# Patient Record
Sex: FEMALE | Race: ASIAN | NOT HISPANIC OR LATINO | URBAN - METROPOLITAN AREA
[De-identification: names, ages, dates, MRNs, and addresses within clinical notes are randomized per-mention and may not be internally consistent; named-entity substitution may affect disease eponyms.]

---

## 2020-02-10 ENCOUNTER — EMERGENCY (EMERGENCY)
Facility: HOSPITAL | Age: 60
LOS: 1 days | Discharge: HOME | End: 2020-02-10
Attending: EMERGENCY MEDICINE | Admitting: EMERGENCY MEDICINE
Payer: COMMERCIAL

## 2020-02-10 VITALS
TEMPERATURE: 98 F | DIASTOLIC BLOOD PRESSURE: 49 MMHG | RESPIRATION RATE: 18 BRPM | OXYGEN SATURATION: 98 % | HEART RATE: 75 BPM | SYSTOLIC BLOOD PRESSURE: 135 MMHG

## 2020-02-10 VITALS — RESPIRATION RATE: 18 BRPM | HEART RATE: 77 BPM | OXYGEN SATURATION: 99 %

## 2020-02-10 LAB
ALBUMIN SERPL ELPH-MCNC: 3.8 G/DL — SIGNIFICANT CHANGE UP (ref 3.3–5)
ALP SERPL-CCNC: 79 U/L — SIGNIFICANT CHANGE UP (ref 40–120)
ALT FLD-CCNC: 23 U/L — SIGNIFICANT CHANGE UP (ref 4–33)
ANION GAP SERPL CALC-SCNC: 13 MMO/L — SIGNIFICANT CHANGE UP (ref 7–14)
AST SERPL-CCNC: 21 U/L — SIGNIFICANT CHANGE UP (ref 4–32)
BASOPHILS # BLD AUTO: 0.05 K/UL — SIGNIFICANT CHANGE UP (ref 0–0.2)
BASOPHILS NFR BLD AUTO: 0.6 % — SIGNIFICANT CHANGE UP (ref 0–2)
BILIRUB SERPL-MCNC: 0.2 MG/DL — SIGNIFICANT CHANGE UP (ref 0.2–1.2)
BUN SERPL-MCNC: 11 MG/DL — SIGNIFICANT CHANGE UP (ref 7–23)
CALCIUM SERPL-MCNC: 9.5 MG/DL — SIGNIFICANT CHANGE UP (ref 8.4–10.5)
CHLORIDE SERPL-SCNC: 100 MMOL/L — SIGNIFICANT CHANGE UP (ref 98–107)
CO2 SERPL-SCNC: 23 MMOL/L — SIGNIFICANT CHANGE UP (ref 22–31)
CREAT SERPL-MCNC: 0.66 MG/DL — SIGNIFICANT CHANGE UP (ref 0.5–1.3)
EOSINOPHIL # BLD AUTO: 0.41 K/UL — SIGNIFICANT CHANGE UP (ref 0–0.5)
EOSINOPHIL NFR BLD AUTO: 5.1 % — SIGNIFICANT CHANGE UP (ref 0–6)
GLUCOSE SERPL-MCNC: 317 MG/DL — HIGH (ref 70–99)
HBA1C BLD-MCNC: 11.4 % — HIGH (ref 4–5.6)
HCT VFR BLD CALC: 38.4 % — SIGNIFICANT CHANGE UP (ref 34.5–45)
HGB BLD-MCNC: 11.4 G/DL — LOW (ref 11.5–15.5)
IMM GRANULOCYTES NFR BLD AUTO: 0.4 % — SIGNIFICANT CHANGE UP (ref 0–1.5)
LYMPHOCYTES # BLD AUTO: 3.91 K/UL — HIGH (ref 1–3.3)
LYMPHOCYTES # BLD AUTO: 48.4 % — HIGH (ref 13–44)
MCHC RBC-ENTMCNC: 23.4 PG — LOW (ref 27–34)
MCHC RBC-ENTMCNC: 29.7 % — LOW (ref 32–36)
MCV RBC AUTO: 78.7 FL — LOW (ref 80–100)
MONOCYTES # BLD AUTO: 0.78 K/UL — SIGNIFICANT CHANGE UP (ref 0–0.9)
MONOCYTES NFR BLD AUTO: 9.7 % — SIGNIFICANT CHANGE UP (ref 2–14)
NEUTROPHILS # BLD AUTO: 2.9 K/UL — SIGNIFICANT CHANGE UP (ref 1.8–7.4)
NEUTROPHILS NFR BLD AUTO: 35.8 % — LOW (ref 43–77)
NRBC # FLD: 0 K/UL — SIGNIFICANT CHANGE UP (ref 0–0)
PLATELET # BLD AUTO: 281 K/UL — SIGNIFICANT CHANGE UP (ref 150–400)
PMV BLD: 10.6 FL — SIGNIFICANT CHANGE UP (ref 7–13)
POTASSIUM SERPL-MCNC: 4.7 MMOL/L — SIGNIFICANT CHANGE UP (ref 3.5–5.3)
POTASSIUM SERPL-SCNC: 4.7 MMOL/L — SIGNIFICANT CHANGE UP (ref 3.5–5.3)
PROT SERPL-MCNC: 7.8 G/DL — SIGNIFICANT CHANGE UP (ref 6–8.3)
RBC # BLD: 4.88 M/UL — SIGNIFICANT CHANGE UP (ref 3.8–5.2)
RBC # FLD: 16.2 % — HIGH (ref 10.3–14.5)
SODIUM SERPL-SCNC: 136 MMOL/L — SIGNIFICANT CHANGE UP (ref 135–145)
WBC # BLD: 8.08 K/UL — SIGNIFICANT CHANGE UP (ref 3.8–10.5)
WBC # FLD AUTO: 8.08 K/UL — SIGNIFICANT CHANGE UP (ref 3.8–10.5)

## 2020-02-10 PROCEDURE — 99283 EMERGENCY DEPT VISIT LOW MDM: CPT

## 2020-02-10 PROCEDURE — 71046 X-RAY EXAM CHEST 2 VIEWS: CPT | Mod: 26

## 2020-02-10 RX ORDER — FLUTICASONE PROPIONATE 50 MCG
1 SPRAY, SUSPENSION NASAL
Qty: 1 | Refills: 0
Start: 2020-02-10

## 2020-02-10 NOTE — ED PROVIDER NOTE - NSFOLLOWUPINSTRUCTIONS_ED_ALL_ED_FT
Please see attached information on cough.     Please return to the ED immediately for Chest pain, Shortness of Breath, new cough, fevers/     Please follow up with your PMD in the next 1-2 weeks. Please see attached information on cough.     Please return to the ED immediately for Chest pain, Shortness of Breath, new cough, fevers    Please follow up with your PMD for diabetes management, you have a hemoglobin A1c of 11.4 and likely need to be on a higher dose of insulin.     Please follow up with your PMD in the next 1-2 weeks.

## 2020-02-10 NOTE — ED PROVIDER NOTE - PATIENT PORTAL LINK FT
You can access the FollowMyHealth Patient Portal offered by [facility] by registering at the following website: [email or web address]. By joining [employer]’s FollowMyHealth portal, you will also be able to view your health information using other applications (apps) compatible with our system.

## 2020-02-10 NOTE — ED PROVIDER NOTE - CLINICAL SUMMARY MEDICAL DECISION MAKING FREE TEXT BOX
59F hx of DM presenting with cough x 6 weeks PE: unremarkable Ddx: Cough is likely from post nasal drip, less likely pna Plan: Basic labs, CXR, likely to be discharged with fluticasone nasal spray for post nasal drip

## 2020-02-10 NOTE — ED PROVIDER NOTE - ATTENDING CONTRIBUTION TO CARE
DR. BLOCH, ATTENDING MD-  I performed a face to face bedside interview with patient regarding history of present illness, review of symptoms and past medical history. I completed an independent physical exam.  I have discussed patient's plan of care with the resident.  Patient examined well appearing NAD HEENT nml no sinus tenderness, Neck supple, heart soundsnml lungs clear, abd soft  nontender, neuro nml.

## 2020-02-10 NOTE — ED PROVIDER NOTE - NS ED ROS FT
CONST: no fevers, no chills, no trauma  EYES: no pain, no visual disturbances  ENT: no sore throat, no epistaxis, no rhinorrhea, no hearing changes  CV: no chest pain, no palpitations, no orthopnea, no extremity pain or swelling  RESP: no shortness of breath, + cough, +white sputum, no pleurisy, no wheezing  ABD: no abdominal pain, no nausea, no vomiting, no diarrhea, no black or bloody stool  : no dysuria, no hematuria, no frequency, no urgency  MSK: no back pain, no neck pain, no extremity pain  NEURO: no headache, no sensory disturbances, no focal weakness, no dizziness  HEME: no easy bleeding or bruising  SKIN: no diaphoresis, no rash

## 2020-02-10 NOTE — ED ADULT NURSE NOTE - OBJECTIVE STATEMENT
p/t is a 59y old female received awake and responsive, c/o of cough for past few weeks on and off, p/t denies any chest pain or sob, vss, bloods drawn and sent to lab, no further c/o noted will continue to monitor

## 2022-11-15 NOTE — ED ADULT TRIAGE NOTE - NS ED TRIAGE AVPU SCALE
Left message for patient to return phone call.     She scheduled for 12/09 for med refill, however, she is not due for a med check appt, she is due for a cpe appt. Please assist patient with rescheduling appt on 12/09 to cpe. Writer indicated in message left that cpe's are booking out a ways at this time. Please refill medication upon rescheduling appt for cpe.   Alert-The patient is alert, awake and responds to voice. The patient is oriented to time, place, and person. The triage nurse is able to obtain subjective information.

## 2023-09-19 NOTE — ED ADULT TRIAGE NOTE - TEMPERATURE IN CELSIUS (DEGREES C)
Spoke with pt she states since starting metoprolol succ on 9/13, she noticed tightness in chest after being on it for a few days. She also has become SOB more often with just speaking. Wheezing and wet cough. Which she says she is a smoker. She says she read wheezing and cough is a side effect listed on the bottle. She does not feel like it is a cold. But also has a slight runny nose. She wants to know if that is normal or maybe the medications needs adjusted? 36.7

## 2024-03-04 NOTE — ED PROVIDER NOTE - TOBACCO USE
Pt continues to yell " nurse, nurse I want my percocet, give me my percocet" ACP Obie Rojo was contacted via teams once again of pt's request. Never smoker

## 2024-07-29 ENCOUNTER — EMERGENCY (EMERGENCY)
Facility: HOSPITAL | Age: 64
LOS: 1 days | Discharge: HOME | End: 2024-07-29
Attending: EMERGENCY MEDICINE | Admitting: EMERGENCY MEDICINE

## 2024-07-29 VITALS
HEART RATE: 76 BPM | RESPIRATION RATE: 16 BRPM | WEIGHT: 201.94 LBS | SYSTOLIC BLOOD PRESSURE: 131 MMHG | DIASTOLIC BLOOD PRESSURE: 76 MMHG | OXYGEN SATURATION: 100 % | TEMPERATURE: 98 F

## 2024-07-29 VITALS
TEMPERATURE: 98 F | DIASTOLIC BLOOD PRESSURE: 73 MMHG | RESPIRATION RATE: 18 BRPM | HEART RATE: 67 BPM | OXYGEN SATURATION: 97 % | SYSTOLIC BLOOD PRESSURE: 110 MMHG

## 2024-07-29 PROBLEM — E11.9 TYPE 2 DIABETES MELLITUS WITHOUT COMPLICATIONS: Chronic | Status: ACTIVE | Noted: 2020-02-10

## 2024-07-29 PROCEDURE — 99283 EMERGENCY DEPT VISIT LOW MDM: CPT

## 2024-07-29 RX ORDER — ACETAMINOPHEN 325 MG
650 TABLET ORAL ONCE
Refills: 0 | Status: COMPLETED | OUTPATIENT
Start: 2024-07-29 | End: 2024-07-29

## 2024-07-29 RX ADMIN — Medication 650 MILLIGRAM(S): at 17:23
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
## 2024-07-29 NOTE — ED PROVIDER NOTE - NSFOLLOWUPINSTRUCTIONS_ED_ALL_ED_FT
A neck sprain is a stretch or tear in one of the tough, fiber-like tissues (ligaments) in your body. This is caused by an injury to the area such as a twisting mechanism. Symptoms include pain, swelling, or bruising. Rest that area over the next several days and slowly resume activity when tolerated. Ice can help with swelling and pain.     SEEK IMMEDIATE MEDICAL CARE IF YOU HAVE ANY OF THE FOLLOWING SYMPTOMS: worsening pain, inability to move that body part, numbness or tingling.

## 2024-07-29 NOTE — ED PROVIDER NOTE - PHYSICAL EXAMINATION
Gen: NAD, non-toxic appearing  Head: normal appearing  HEENT: normal conjunctiva, oral mucosa moist, PERRLA, EOMI, visual acuity- L: 20/40, R: 20/30, B/L: 20/30  Abd: soft, non distended, non tender   MSK: no visible deformities, no midline spinal tenderness  Neuro: No focal deficits, AAOx3  Skin: Warm  Psych: normal affect
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
## 2024-07-29 NOTE — ED PROVIDER NOTE - PATIENT PORTAL LINK FT
You can access the FollowMyHealth Patient Portal offered by [facility] by registering at the following website: [email or web address]. By joining [employer]’s FollowMyHealth portal, you will also be able to view your health information using other applications (apps) compatible with our system.

## 2024-12-11 ENCOUNTER — RESULT REVIEW (OUTPATIENT)
Age: 64
End: 2024-12-11

## 2024-12-11 ENCOUNTER — INPATIENT (INPATIENT)
Facility: HOSPITAL | Age: 64
LOS: 6 days | Discharge: HOME | End: 2024-12-18
Attending: INTERNAL MEDICINE | Admitting: INTERNAL MEDICINE
Payer: MEDICARE

## 2024-12-11 VITALS
SYSTOLIC BLOOD PRESSURE: 125 MMHG | HEIGHT: 65 IN | RESPIRATION RATE: 16 BRPM | TEMPERATURE: 98 F | HEART RATE: 73 BPM | OXYGEN SATURATION: 97 % | DIASTOLIC BLOOD PRESSURE: 81 MMHG | WEIGHT: 195.11 LBS

## 2024-12-11 DIAGNOSIS — Z29.9 ENCOUNTER FOR PROPHYLACTIC MEASURES, UNSPECIFIED: ICD-10-CM

## 2024-12-11 DIAGNOSIS — Z98.891 HISTORY OF UTERINE SCAR FROM PREVIOUS SURGERY: Chronic | ICD-10-CM

## 2024-12-11 DIAGNOSIS — Z86.79 PERSONAL HISTORY OF OTHER DISEASES OF THE CIRCULATORY SYSTEM: ICD-10-CM

## 2024-12-11 DIAGNOSIS — E03.9 HYPOTHYROIDISM, UNSPECIFIED: ICD-10-CM

## 2024-12-11 DIAGNOSIS — K83.8 OTHER SPECIFIED DISEASES OF BILIARY TRACT: ICD-10-CM

## 2024-12-11 DIAGNOSIS — E78.5 HYPERLIPIDEMIA, UNSPECIFIED: ICD-10-CM

## 2024-12-11 DIAGNOSIS — K21.9 GASTRO-ESOPHAGEAL REFLUX DISEASE WITHOUT ESOPHAGITIS: ICD-10-CM

## 2024-12-11 DIAGNOSIS — E11.9 TYPE 2 DIABETES MELLITUS WITHOUT COMPLICATIONS: ICD-10-CM

## 2024-12-11 DIAGNOSIS — R10.11 RIGHT UPPER QUADRANT PAIN: ICD-10-CM

## 2024-12-11 LAB
ADD ON TEST-SPECIMEN IN LAB: SIGNIFICANT CHANGE UP
ADD ON TEST-SPECIMEN IN LAB: SIGNIFICANT CHANGE UP
ALBUMIN SERPL ELPH-MCNC: 4.5 G/DL — SIGNIFICANT CHANGE UP (ref 3.3–5)
ALP SERPL-CCNC: 57 U/L — SIGNIFICANT CHANGE UP (ref 40–120)
ALT FLD-CCNC: 14 U/L — SIGNIFICANT CHANGE UP (ref 4–33)
ANION GAP SERPL CALC-SCNC: 17 MMOL/L — HIGH (ref 7–14)
APPEARANCE UR: CLEAR — SIGNIFICANT CHANGE UP
APTT BLD: 34 SEC — SIGNIFICANT CHANGE UP (ref 24.5–35.6)
AST SERPL-CCNC: 15 U/L — SIGNIFICANT CHANGE UP (ref 4–32)
B-OH-BUTYR SERPL-SCNC: 1.5 MMOL/L — HIGH (ref 0–0.4)
BASOPHILS # BLD AUTO: 0.07 K/UL — SIGNIFICANT CHANGE UP (ref 0–0.2)
BASOPHILS NFR BLD AUTO: 0.5 % — SIGNIFICANT CHANGE UP (ref 0–2)
BILIRUB SERPL-MCNC: 0.3 MG/DL — SIGNIFICANT CHANGE UP (ref 0.2–1.2)
BILIRUB UR-MCNC: NEGATIVE — SIGNIFICANT CHANGE UP
BLD GP AB SCN SERPL QL: NEGATIVE — SIGNIFICANT CHANGE UP
BUN SERPL-MCNC: 17 MG/DL — SIGNIFICANT CHANGE UP (ref 7–23)
CALCIUM SERPL-MCNC: 9.8 MG/DL — SIGNIFICANT CHANGE UP (ref 8.4–10.5)
CHLORIDE SERPL-SCNC: 100 MMOL/L — SIGNIFICANT CHANGE UP (ref 98–107)
CO2 SERPL-SCNC: 23 MMOL/L — SIGNIFICANT CHANGE UP (ref 22–31)
COLOR SPEC: YELLOW — SIGNIFICANT CHANGE UP
CREAT SERPL-MCNC: 0.73 MG/DL — SIGNIFICANT CHANGE UP (ref 0.5–1.3)
DIFF PNL FLD: NEGATIVE — SIGNIFICANT CHANGE UP
EGFR: 91 ML/MIN/1.73M2 — SIGNIFICANT CHANGE UP
EOSINOPHIL # BLD AUTO: 0.02 K/UL — SIGNIFICANT CHANGE UP (ref 0–0.5)
EOSINOPHIL NFR BLD AUTO: 0.1 % — SIGNIFICANT CHANGE UP (ref 0–6)
GLUCOSE BLDC GLUCOMTR-MCNC: 127 MG/DL — HIGH (ref 70–99)
GLUCOSE BLDC GLUCOMTR-MCNC: 144 MG/DL — HIGH (ref 70–99)
GLUCOSE BLDC GLUCOMTR-MCNC: 76 MG/DL — SIGNIFICANT CHANGE UP (ref 70–99)
GLUCOSE BLDC GLUCOMTR-MCNC: 84 MG/DL — SIGNIFICANT CHANGE UP (ref 70–99)
GLUCOSE BLDC GLUCOMTR-MCNC: 95 MG/DL — SIGNIFICANT CHANGE UP (ref 70–99)
GLUCOSE BLDC GLUCOMTR-MCNC: 99 MG/DL — SIGNIFICANT CHANGE UP (ref 70–99)
GLUCOSE SERPL-MCNC: 219 MG/DL — HIGH (ref 70–99)
GLUCOSE UR QL: 250 MG/DL
HCT VFR BLD CALC: 42.7 % — SIGNIFICANT CHANGE UP (ref 34.5–45)
HGB BLD-MCNC: 13.4 G/DL — SIGNIFICANT CHANGE UP (ref 11.5–15.5)
IANC: 11.28 K/UL — HIGH (ref 1.8–7.4)
IMM GRANULOCYTES NFR BLD AUTO: 0.4 % — SIGNIFICANT CHANGE UP (ref 0–0.9)
INR BLD: 1.04 RATIO — SIGNIFICANT CHANGE UP (ref 0.85–1.16)
KETONES UR-MCNC: 15 MG/DL
LEUKOCYTE ESTERASE UR-ACNC: NEGATIVE — SIGNIFICANT CHANGE UP
LIDOCAIN IGE QN: 41 U/L — SIGNIFICANT CHANGE UP (ref 7–60)
LYMPHOCYTES # BLD AUTO: 1.69 K/UL — SIGNIFICANT CHANGE UP (ref 1–3.3)
LYMPHOCYTES # BLD AUTO: 12.5 % — LOW (ref 13–44)
MCHC RBC-ENTMCNC: 26.5 PG — LOW (ref 27–34)
MCHC RBC-ENTMCNC: 31.4 G/DL — LOW (ref 32–36)
MCV RBC AUTO: 84.6 FL — SIGNIFICANT CHANGE UP (ref 80–100)
MONOCYTES # BLD AUTO: 0.37 K/UL — SIGNIFICANT CHANGE UP (ref 0–0.9)
MONOCYTES NFR BLD AUTO: 2.7 % — SIGNIFICANT CHANGE UP (ref 2–14)
NEUTROPHILS # BLD AUTO: 11.28 K/UL — HIGH (ref 1.8–7.4)
NEUTROPHILS NFR BLD AUTO: 83.8 % — HIGH (ref 43–77)
NITRITE UR-MCNC: NEGATIVE — SIGNIFICANT CHANGE UP
NRBC # BLD: 0 /100 WBCS — SIGNIFICANT CHANGE UP (ref 0–0)
NRBC # FLD: 0 K/UL — SIGNIFICANT CHANGE UP (ref 0–0)
NT-PROBNP SERPL-SCNC: <36 PG/ML — SIGNIFICANT CHANGE UP
PH UR: 6 — SIGNIFICANT CHANGE UP (ref 5–8)
PLATELET # BLD AUTO: 297 K/UL — SIGNIFICANT CHANGE UP (ref 150–400)
POTASSIUM SERPL-MCNC: 4.2 MMOL/L — SIGNIFICANT CHANGE UP (ref 3.5–5.3)
POTASSIUM SERPL-SCNC: 4.2 MMOL/L — SIGNIFICANT CHANGE UP (ref 3.5–5.3)
PROCALCITONIN SERPL-MCNC: 0.06 NG/ML — SIGNIFICANT CHANGE UP (ref 0.02–0.1)
PROT SERPL-MCNC: 8.2 G/DL — SIGNIFICANT CHANGE UP (ref 6–8.3)
PROT UR-MCNC: NEGATIVE MG/DL — SIGNIFICANT CHANGE UP
PROTHROM AB SERPL-ACNC: 12.1 SEC — SIGNIFICANT CHANGE UP (ref 9.9–13.4)
RBC # BLD: 5.05 M/UL — SIGNIFICANT CHANGE UP (ref 3.8–5.2)
RBC # FLD: 14.4 % — SIGNIFICANT CHANGE UP (ref 10.3–14.5)
RH IG SCN BLD-IMP: POSITIVE — SIGNIFICANT CHANGE UP
SODIUM SERPL-SCNC: 140 MMOL/L — SIGNIFICANT CHANGE UP (ref 135–145)
SP GR SPEC: 1.04 — HIGH (ref 1–1.03)
TROPONIN T, HIGH SENSITIVITY RESULT: <6 NG/L — SIGNIFICANT CHANGE UP
UROBILINOGEN FLD QL: 0.2 MG/DL — SIGNIFICANT CHANGE UP (ref 0.2–1)
WBC # BLD: 13.49 K/UL — HIGH (ref 3.8–10.5)
WBC # FLD AUTO: 13.49 K/UL — HIGH (ref 3.8–10.5)

## 2024-12-11 PROCEDURE — 99223 1ST HOSP IP/OBS HIGH 75: CPT | Mod: GC

## 2024-12-11 PROCEDURE — 93306 TTE W/DOPPLER COMPLETE: CPT | Mod: 26

## 2024-12-11 PROCEDURE — 99285 EMERGENCY DEPT VISIT HI MDM: CPT

## 2024-12-11 PROCEDURE — 99222 1ST HOSP IP/OBS MODERATE 55: CPT | Mod: GC

## 2024-12-11 PROCEDURE — 76705 ECHO EXAM OF ABDOMEN: CPT | Mod: 26

## 2024-12-11 PROCEDURE — 76376 3D RENDER W/INTRP POSTPROCES: CPT | Mod: 26

## 2024-12-11 PROCEDURE — 93356 MYOCRD STRAIN IMG SPCKL TRCK: CPT

## 2024-12-11 RX ORDER — ONDANSETRON HYDROCHLORIDE 4 MG/1
4 TABLET, FILM COATED ORAL ONCE
Refills: 0 | Status: COMPLETED | OUTPATIENT
Start: 2024-12-11 | End: 2024-12-11

## 2024-12-11 RX ORDER — METOPROLOL TARTRATE 100 MG/1
50 TABLET, FILM COATED ORAL DAILY
Refills: 0 | Status: DISCONTINUED | OUTPATIENT
Start: 2024-12-11 | End: 2024-12-18

## 2024-12-11 RX ORDER — LEVOTHYROXINE SODIUM 150 MCG
112 TABLET ORAL DAILY
Refills: 0 | Status: DISCONTINUED | OUTPATIENT
Start: 2024-12-11 | End: 2024-12-18

## 2024-12-11 RX ORDER — INSULIN GLARGINE 100 [IU]/ML
4 INJECTION, SOLUTION SUBCUTANEOUS AT BEDTIME
Refills: 0 | Status: DISCONTINUED | OUTPATIENT
Start: 2024-12-11 | End: 2024-12-11

## 2024-12-11 RX ORDER — INSULIN GLARGINE 100 [IU]/ML
6 INJECTION, SOLUTION SUBCUTANEOUS AT BEDTIME
Refills: 0 | Status: DISCONTINUED | OUTPATIENT
Start: 2024-12-11 | End: 2024-12-18

## 2024-12-11 RX ORDER — RAMIPRIL 10 MG/1
1 CAPSULE ORAL
Refills: 0 | DISCHARGE

## 2024-12-11 RX ORDER — 0.9 % SODIUM CHLORIDE 0.9 %
1000 INTRAVENOUS SOLUTION INTRAVENOUS
Refills: 0 | Status: DISCONTINUED | OUTPATIENT
Start: 2024-12-11 | End: 2024-12-18

## 2024-12-11 RX ORDER — METOCLOPRAMIDE HYDROCHLORIDE 10 MG/1
5 TABLET ORAL EVERY 8 HOURS
Refills: 0 | Status: DISCONTINUED | OUTPATIENT
Start: 2024-12-11 | End: 2024-12-18

## 2024-12-11 RX ORDER — ENOXAPARIN SODIUM 30 MG/.3ML
40 INJECTION SUBCUTANEOUS EVERY 24 HOURS
Refills: 0 | Status: DISCONTINUED | OUTPATIENT
Start: 2024-12-11 | End: 2024-12-15

## 2024-12-11 RX ORDER — KETOROLAC TROMETHAMINE 30 MG/ML
15 INJECTION INTRAMUSCULAR; INTRAVENOUS ONCE
Refills: 0 | Status: DISCONTINUED | OUTPATIENT
Start: 2024-12-11 | End: 2024-12-11

## 2024-12-11 RX ORDER — 0.9 % SODIUM CHLORIDE 0.9 %
1000 INTRAVENOUS SOLUTION INTRAVENOUS
Refills: 0 | Status: DISCONTINUED | OUTPATIENT
Start: 2024-12-11 | End: 2024-12-12

## 2024-12-11 RX ORDER — GLUCAGON INJECTION, SOLUTION 0.5 MG/.1ML
1 INJECTION, SOLUTION SUBCUTANEOUS ONCE
Refills: 0 | Status: DISCONTINUED | OUTPATIENT
Start: 2024-12-11 | End: 2024-12-18

## 2024-12-11 RX ORDER — ACETAMINOPHEN 500MG 500 MG/1
1000 TABLET, COATED ORAL ONCE
Refills: 0 | Status: COMPLETED | OUTPATIENT
Start: 2024-12-11 | End: 2024-12-11

## 2024-12-11 RX ORDER — 0.9 % SODIUM CHLORIDE 0.9 %
1000 INTRAVENOUS SOLUTION INTRAVENOUS
Refills: 0 | Status: DISCONTINUED | OUTPATIENT
Start: 2024-12-11 | End: 2024-12-11

## 2024-12-11 RX ORDER — LEVOTHYROXINE SODIUM 150 MCG
1 TABLET ORAL
Refills: 0 | DISCHARGE

## 2024-12-11 RX ORDER — PANTOPRAZOLE SODIUM 40 MG/1
1 TABLET, DELAYED RELEASE ORAL
Refills: 0 | DISCHARGE

## 2024-12-11 RX ORDER — PANTOPRAZOLE SODIUM 40 MG/1
40 TABLET, DELAYED RELEASE ORAL
Refills: 0 | Status: DISCONTINUED | OUTPATIENT
Start: 2024-12-11 | End: 2024-12-18

## 2024-12-11 RX ORDER — EMPAGLIFLOZIN 25 MG/1
1 TABLET, FILM COATED ORAL
Refills: 0 | DISCHARGE

## 2024-12-11 RX ORDER — SODIUM CHLORIDE 9 MG/ML
1000 INJECTION, SOLUTION INTRAMUSCULAR; INTRAVENOUS; SUBCUTANEOUS ONCE
Refills: 0 | Status: COMPLETED | OUTPATIENT
Start: 2024-12-11 | End: 2024-12-11

## 2024-12-11 RX ORDER — ROSUVASTATIN CALCIUM 5 MG/1
1 TABLET, FILM COATED ORAL
Refills: 0 | DISCHARGE

## 2024-12-11 RX ORDER — GLIMEPIRIDE 1 MG/1
1 TABLET ORAL
Refills: 0 | DISCHARGE

## 2024-12-11 RX ORDER — LISINOPRIL 20 MG/1
20 TABLET ORAL DAILY
Refills: 0 | Status: DISCONTINUED | OUTPATIENT
Start: 2024-12-11 | End: 2024-12-17

## 2024-12-11 RX ORDER — ONDANSETRON HYDROCHLORIDE 4 MG/1
4 TABLET, FILM COATED ORAL ONCE
Refills: 0 | Status: DISCONTINUED | OUTPATIENT
Start: 2024-12-11 | End: 2024-12-11

## 2024-12-11 RX ORDER — ROSUVASTATIN CALCIUM 5 MG/1
5 TABLET, FILM COATED ORAL AT BEDTIME
Refills: 0 | Status: DISCONTINUED | OUTPATIENT
Start: 2024-12-11 | End: 2024-12-18

## 2024-12-11 RX ADMIN — ENOXAPARIN SODIUM 40 MILLIGRAM(S): 30 INJECTION SUBCUTANEOUS at 17:10

## 2024-12-11 RX ADMIN — ACETAMINOPHEN 500MG 1000 MILLIGRAM(S): 500 TABLET, COATED ORAL at 13:03

## 2024-12-11 RX ADMIN — Medication 81 MILLIGRAM(S): at 17:08

## 2024-12-11 RX ADMIN — ONDANSETRON HYDROCHLORIDE 4 MILLIGRAM(S): 4 TABLET, FILM COATED ORAL at 04:15

## 2024-12-11 RX ADMIN — KETOROLAC TROMETHAMINE 15 MILLIGRAM(S): 30 INJECTION INTRAMUSCULAR; INTRAVENOUS at 04:16

## 2024-12-11 RX ADMIN — METOPROLOL TARTRATE 50 MILLIGRAM(S): 100 TABLET, FILM COATED ORAL at 17:08

## 2024-12-11 RX ADMIN — INSULIN GLARGINE 6 UNIT(S): 100 INJECTION, SOLUTION SUBCUTANEOUS at 23:44

## 2024-12-11 RX ADMIN — Medication 100 MILLILITER(S): at 09:35

## 2024-12-11 RX ADMIN — ROSUVASTATIN CALCIUM 5 MILLIGRAM(S): 5 TABLET, FILM COATED ORAL at 22:22

## 2024-12-11 RX ADMIN — ACETAMINOPHEN 500MG 400 MILLIGRAM(S): 500 TABLET, COATED ORAL at 12:03

## 2024-12-11 RX ADMIN — SODIUM CHLORIDE 1000 MILLILITER(S): 9 INJECTION, SOLUTION INTRAMUSCULAR; INTRAVENOUS; SUBCUTANEOUS at 04:16

## 2024-12-11 RX ADMIN — ONDANSETRON HYDROCHLORIDE 4 MILLIGRAM(S): 4 TABLET, FILM COATED ORAL at 06:16

## 2024-12-11 NOTE — H&P ADULT - HISTORY OF PRESENT ILLNESS
Vero Soto is a 65 yo F with a PMHx of DM2 on insulin, HTN, HLD, hypothyroidism presenting with 12 hours of RUQ pain associated with nausea and vomiting. Last night patient ate some spicy duck she prepared at home. About two hours later, she began having sharp RUQ pain, associated with nausea and multiple episodes of vomiting. Emesis is bilious, with undigested food and without blood. She also has a headache. Patient has vomited with any PO intake. She reports that this has never happened before. No one else ate the food. No recent travel, no sick contacts, no recent infections. Patient's only surgical history is  section. Patient reports some chills but no CP, SOB, diarrhea, melena or dysuria.     In the ED, patient's vital signs was afebrile at 98.2, HR of 73, /81, RR 16, SpO2 97% on RA. Labs were notable for leukocytosis of 13.49 and hyperglycemia at 219, with a UA notable for + glucose and ketones. RUQ ultrasound in the ED revealed cholelithiasis w/o evidence of acute cholecystitis with a dilated common bile duct.  Vero Soto is a 63 yo F with a PMHx of DM2 on insulin, HTN, HLD, hypothyroidism presenting with 12 hours of RUQ pain associated with nausea and vomiting. Last night patient ate some spicy duck she prepared at home. About two hours later, she began having sharp RUQ pain, associated with nausea and multiple episodes of vomiting. Emesis is bilious, with undigested food and without blood. She also has a headache. Patient has vomited with any PO intake. She reports that this has never happened before. No one else ate the food. No recent travel, no sick contacts, no recent infections. Patient's only surgical history is  section. Patient reports some chills but no CP, SOB, diarrhea, melena or dysuria.     In the ED, patient's vital signs was afebrile at 98.2, HR of 73, /81, RR 16, SpO2 97% on RA. Labs were notable for leukocytosis of 13.49 and hyperglycemia at 219, with a UA notable for + glucose and ketones. RUQ ultrasound in the ED revealed cholelithiasis w/o evidence of acute cholecystitis with a dilated common bile duct. S/p 2 L of NS, zofran and IV tylenol and admitted to medicine for workup of RUQ pain.

## 2024-12-11 NOTE — ED PROVIDER NOTE - ATTENDING APP SHARED VISIT CONTRIBUTION OF CARE
64-year-old female with a history of non-insulin-dependent diabetes, GERD, hypertension, hyperlipidemia, anemia that has not had any surgical procedures in her life that presents with abdominal pain, nausea, vomiting starting approximately 8 hours prior to arrival.  Patient states that she last ate 12 hours prior to arrival had some chicken approximately 2 hours later started having epigastric and right upper quadrant pain 10/10 sharp achy nonradiating associate with multiple episodes of nonbloody nonbilious emesis.  She has not been able to take any medications but came to the ED for evaluation.  Currently denies any chest pain, shortness of breath and has been in her usual state of health without any urinary symptoms diarrhea, fevers, chills, travel.  No sick contacts otherwise no flank pain.  No smoking no drinking no drugs    Patient is well-appearing no acute distress rubbing the right side of her abdomen.  Abdomen is soft with a positive Hughes's negative McBurney's no rebound or guarding or masses no CVA tenderness bilaterally. Normal and equal pulses x 4.     64-year-old female with a history of non-insulin-dependent diabetes, GERD, hypertension, hyperlipidemia, anemia that has not had any surgical procedures in her life that presents with abdominal pain, nausea, vomiting starting approximately 8 hours prior to arrival.  Patient found to have positive Hughes's on exam and due to her nausea vomiting and abdominal pain in the right upper quadrant concern is likely for gallstones/biliary colic versus cholecystitis although no fevers or other signs and symptoms of infection at this time.  Also considered to kidney stone although no flank pain also consider dissection although highly unlikely as patient has bilateral and equal pulses.  No back pain or other concerns or symptoms such as hypotension or tachycardia.  No chest pain.  Will obtain labs and imaging provide medications and fluids and reassess.

## 2024-12-11 NOTE — ED ADULT NURSE REASSESSMENT NOTE - NS ED NURSE REASSESS COMMENT FT1
Pt sitting in stretcher. Endorsing nausea at this time. Emesis bag at bedside. MD Zavaleta paged for further instruction. VSS. Respirations even and unlabored. Admitted for Dilated Bile Duct, pending transfer to ESSU 2.

## 2024-12-11 NOTE — PATIENT PROFILE ADULT - FALL HARM RISK - HARM RISK INTERVENTIONS

## 2024-12-11 NOTE — ED ADULT TRIAGE NOTE - CHIEF COMPLAINT QUOTE
Pt complains of RUQ pain that started around 2000 yesterday associated nausea and vomiting. History of HTN, DM2, HLD.

## 2024-12-11 NOTE — H&P ADULT - PROBLEM SELECTOR PLAN 1
RUQ pain associated with N/V. + leukocytosis of 13.49. US in ED revealed cholelithaisis w/o evidence of acute cholecystitis and dilated common bile duct    Plan  - NPO  - f/u GI recs  - GI PCR  - obtain troponin RUQ pain associated with N/V. + leukocytosis of 13.49. US in ED revealed cholelithaisis w/o evidence of acute cholecystitis and dilated common bile duct    Plan  - NPO  - f/u GI recs  - Obtain MRCP   - GI PCR if has diarrhea   - obtain troponin to r/o atypical CP

## 2024-12-11 NOTE — CONSULT NOTE ADULT - ASSESSMENT
64F with NIDDM, GERD, hypertension, hyperlipidemia, presents with RUQ/epigastric abdominal pain associated with nausea/vomiting    Impression  #RUQ/epigastric pain  #nausea/vomiting  Patient presenting with RUQ/epigastric pain associated with nausea/vomiting following dinner (duck) yesterday evening. Hemodynamically stable. Labs with WBC 13k; LFTs normal. RUQUS with Cholelithiasis. Mild distention. Negative sonographic CBD 8mm. Suspect presentation related to gastroenteritis vs. less likely choledocholithiasis given normal liver tests; CBD of 8mm likely normal for her age.    Recommendations  - follow up MRCP  - trend CBC, CMP  - pain control, anti-emetics PRN    Note and recommendations are incomplete until reviewed and attested by attending.    Lili Krueger MD  GI/Hepatology Fellow, PGY5  [place] Pager [phone] or [facility] Pager [phone]  Teams preferred (7AM to 5PM); after 5PM, call GI fellow on call    On Weekends/Holidays (All Day) and Weekdays after 5PM to 8AM  For non-urgent consults, please email [email or web address] and [email or web address]  For urgent consults, please contact on call GI team. See Amion schedule ([facility]), [employer] paging system ([facility]), or call hospital  ([facility]) 64F with NIDDM, GERD, hypertension, hyperlipidemia, presents with RUQ/epigastric abdominal pain associated with nausea/vomiting    Impression  #RUQ/epigastric pain  #nausea/vomiting  Patient presenting with RUQ/epigastric pain associated with nausea/vomiting following dinner (duck) yesterday evening. Hemodynamically stable. Labs with WBC 13k; LFTs normal. RUQUS with Cholelithiasis. Mild distention. Negative sonographic pal.  CBD 8mm.     Suspect presentation related to gastroenteritis vs. less likely choledocholithiasis given normal liver tests.    Recommendations  - follow up MRCP  - trend CBC, CMP  - pain control, anti-emetics PRN    Note and recommendations are incomplete until reviewed and attested by attending.    Lili Krueger MD  GI/Hepatology Fellow, PGY5  [place] Pager [phone] or [employer] Pager [phone]  Teams preferred (7AM to 5PM); after 5PM, call GI fellow on call    On Weekends/Holidays (All Day) and Weekdays after 5PM to 8AM  For non-urgent consults, please email [email or web address] and [email or web address]  For urgent consults, please contact on call GI team. See Amion schedule ([facility]), "[employer] paging system ([facility]), or call hospital  ([facility])

## 2024-12-11 NOTE — H&P ADULT - NSHPPHYSICALEXAM_GEN_ALL_CORE
GENERAL: NAD, lying in bed comfortably  HEAD: Atraumatic, normocephalic  EYES: EOMI, PERRLA, conjunctiva and sclera clear  ENT: Moist mucous membranes  HEART: S1, S2, Regular rate and rhythm, no murmurs, rubs, or gallops  LUNGS: Unlabored respirations, clear to auscultation bilaterally, no crackles, wheezing, or rhonchi  ABDOMEN: + tenderness of RUQ. Soft,  nondistended, +BS, No hepatosplenomegaly, no CVA tenderness   EXTREMITIES: Trace pitting edema b/l lower extremities. 2+ peripheral pulses bilaterally. No clubbing or cyanosis.  NERVOUS SYSTEM:  A&Ox3, no focal deficits   SKIN: Abdominal striae. No other rashes or lesions. GENERAL: NAD, lying in bed comfortably  HEAD: Atraumatic, normocephalic  EYES: EOMI, PERRLA, conjunctiva and sclera clear  ENT: Moist mucous membranes  HEART: S1, S2, Regular rate and rhythm, no murmurs, rubs, or gallops  LUNGS: Unlabored respirations, clear to auscultation bilaterally, no crackles, wheezing, or rhonchi  ABDOMEN: + tenderness of RUQ. Soft,  nondistended, +BS, No hepatosplenomegaly, no CVA tenderness. Negative Hughes's sign   EXTREMITIES: Trace pitting edema b/l lower extremities. 2+ peripheral pulses bilaterally. No clubbing or cyanosis.  NERVOUS SYSTEM:  A&Ox3, no focal deficits   SKIN: Abdominal striae. No other rashes or lesions.

## 2024-12-11 NOTE — ED ADULT NURSE NOTE - OBJECTIVE STATEMENT
Pt is A&O x 4, ambulatory w/o assistance, presents to the ED w/ worsening RUQ pain since 2000, 12/10/24. Pt also endorsing nausea w/ episodes of vomitus and subjective fevers/chills. Denies gu sx, dizziness/vision changes,  h/a, SOB or chest discomfort. VSS. Respirations even and unlabored. Pt is A&O x 4, ambulatory w/o assistance, presents to the ED w/ worsening RUQ pain since 2000, 12/10/24. Pt also endorsing nausea w/ "many" episodes of vomitus and subjective fevers/chills. Denies gu sx, dizziness/vision changes,  h/a, SOB or chest discomfort. VSS. Respirations even and unlabored. Left AC 20 gauge IV line placed and labs drawn. Medications administered and IVF initiated as per MAR. Pending lab results and US.

## 2024-12-11 NOTE — H&P ADULT - ASSESSMENT
Vero Soto is a 63 yo F with a PMHx of DM2 on insulin, HTN, HLD, hypothyroidism presenting with acute RUQ pain associated with nausea and vomiting. Physical exam is notable for RUQ tenderness to palpation, and labs are notable for leukocytosis of 13.5, and + glucose and ketones in urine. DDx includes gastritis/gastroenteritis vs. cholecystitis vs. MI  Vero Soto is a 65 yo F with a PMHx of DM2 on insulin, HTN, HLD, hypothyroidism presenting with acute RUQ pain associated with nausea and vomiting. Physical exam is notable for RUQ tenderness to palpation, and labs are notable for leukocytosis of 13.5, and + glucose and ketones in urine. DDx includes gastritis/gastroenteritis vs. choledocholithiasis vs. MI

## 2024-12-11 NOTE — H&P ADULT - NSHPREVIEWOFSYSTEMS_GEN_ALL_CORE
REVIEW OF SYSTEMS:    CONSTITUTIONAL: No weakness, fevers or chills  EYES/ENT: No visual changes;  No vertigo or throat pain   NECK: No pain or stiffness  RESPIRATORY: No cough, wheezing, hemoptysis; No shortness of breath  CARDIOVASCULAR: No chest pain or palpitations  GASTROINTESTINAL: As above  GENITOURINARY: No dysuria, frequency or hematuria  NEUROLOGICAL: No numbness or weakness  SKIN: No itching, rashes

## 2024-12-11 NOTE — ED PROVIDER NOTE - OBJECTIVE STATEMENT
Patient is a 64-year-old female, past medical history of high blood pressure and diabetes presenting with a complaint of right upper quadrant pain associated with nausea and vomiting.  Patient reports eating a few hours prior to the onset of symptoms.  No associated fever, chills, urinary symptoms, chest pain.

## 2024-12-11 NOTE — ED PROVIDER NOTE - CLINICAL SUMMARY MEDICAL DECISION MAKING FREE TEXT BOX
Patient is a 64-year-old female, past medical history of high blood pressure and diabetes presenting with a complaint of right upper quadrant pain associated with nausea and vomiting. On presentation patient appears comfortable, tenderness noted right upper quadrant.  Symptoms concerning for possible biliary pathology, pancreatitis.  Plan for routine labs including lipase, right upper quadrant sono will provide analgesics and antiemetics.

## 2024-12-11 NOTE — H&P ADULT - PROBLEM SELECTOR PLAN 7
DVT prophylaxis: SC Heparin  Code Status: Full Code  Diet: NPO  Dispo: Pending clinical improvement DVT prophylaxis: SC Heparin  Code Status: Full Code  Diet: NPO for now   Dispo: Pending clinical improvement

## 2024-12-11 NOTE — H&P ADULT - NSICDXPASTMEDICALHX_GEN_ALL_CORE_FT
PAST MEDICAL HISTORY:  Diabetes     H/O: HTN (hypertension)     HLD (hyperlipidemia)     Hypothyroid

## 2024-12-11 NOTE — ED ADULT NURSE NOTE - NSFALLUNIVINTERV_ED_ALL_ED
Bed/Stretcher in lowest position, wheels locked, appropriate side rails in place/Call bell, personal items and telephone in reach/Instruct patient to call for assistance before getting out of bed/chair/stretcher/Non-slip footwear applied when patient is off stretcher/[place] to call system/Physically safe environment - no spills, clutter or unnecessary equipment/Purposeful proactive rounding/Room/bathroom lighting operational, light cord in reach

## 2024-12-11 NOTE — H&P ADULT - PROBLEM SELECTOR PLAN 2
Hx of DM2 on insulin, metformin 1000 mg, Jardiance 10 mg, Glimepriride 4 mg at home.     Plan  - hold oral medications Hx of DM2 on insulin (12 U qhs), metformin 1000 mg, Jardiance 10 mg, Glimepriride 4 mg at home.     Plan  - hold oral medications  - begin basal 6 U QHS  - SSI TID QHS  - FS TID QHS Hx of DM2 on insulin (12 U lantus qhs), metformin 1000 mg, Jardiance 10 mg, Glimepiride 4 mg at home.     Plan  - hold oral medications  - begin basal 6 U QHS  - SSI q6h while NPO  - FS q6h while NPO Hx of DM2 on insulin (12 U lantus qhs), metformin 1000 mg, Jardiance 10 mg, Glimepiride 4 mg at home.     Plan  - hold oral medications  - begin basal 6 U QHS (NPO)  - SSI q6h while NPO  - FS q6h while NPO

## 2024-12-11 NOTE — H&P ADULT - NSHPLABSRESULTS_GEN_ALL_CORE
EKG: personally reviewed-  NSR with chronic T wave inversion    LABS: personally reviewed                        13.4   13.49 )-----------( 297      ( 11 Dec 2024 04:15 )             42.7         140  |  100  |  17  ----------------------------<  219[H]  4.2   |  23  |  0.73    Ca    9.8      11 Dec 2024 04:15    TPro  8.2  /  Alb  4.5  /  TBili  0.3  /  DBili  x   /  AST  15  /  ALT  14  /  AlkPhos  57  12-11    PT/INR - ( 11 Dec 2024 04:15 )   PT: 12.1 sec;   INR: 1.04 ratio         PTT - ( 11 Dec 2024 04:15 )  PTT:34.0 sec  Urinalysis Basic - ( 11 Dec 2024 07:37 )    Color: Yellow / Appearance: Clear / S.044 / pH: x  Gluc: x / Ketone: 15 mg/dL  / Bili: Negative / Urobili: 0.2 mg/dL   Blood: x / Protein: Negative mg/dL / Nitrite: Negative   Leuk Esterase: Negative / RBC: x / WBC x   Sq Epi: x / Non Sq Epi: x / Bacteria: x          IMAGING: personally reviewed    RUQ US  Cholelithiasis without sonographic evidence of acute cholecystitis.  Dilated common bile duct. Recommend further evaluation with contrast-enhanced MR abdomen/MRCP.

## 2024-12-11 NOTE — H&P ADULT - ATTENDING COMMENTS
Would obtain MRCP to rule out choledocholethiasis.  NPO for now.  plan of care discussed with daughter at bedside.

## 2024-12-11 NOTE — CONSULT NOTE ADULT - SUBJECTIVE AND OBJECTIVE BOX
Chief Complaint:  Patient is a 64y old  Female who presents with a chief complaint of Abdominal pain (11 Dec 2024 07:20)    HPI:  64F with NIDDM, GERD, hypertension, hyperlipidemia, presents with RUQ/epigastric abdominal pain described as an ache and non-radiating; associated with nausea and multiple episodes of vomiting since yesterday; reports eating duck about 2 hours prior to symptom onset. No diarrhea, fevers.  No similar symptoms in the past. No sick contacts. Prior surgical hx:     Allergies:  Allergy Status Unknown      Home Medications:    Hospital Medications:  dextrose 5%. 1000 milliLiter(s) IV Continuous <Continuous>  dextrose 5%. 1000 milliLiter(s) IV Continuous <Continuous>  dextrose 50% Injectable 25 Gram(s) IV Push once  dextrose 50% Injectable 12.5 Gram(s) IV Push once  dextrose 50% Injectable 25 Gram(s) IV Push once  dextrose Oral Gel 15 Gram(s) Oral once PRN  glucagon  Injectable 1 milliGRAM(s) IntraMuscular once  insulin glargine Injectable (LANTUS) 4 Unit(s) SubCutaneous at bedtime  insulin lispro (ADMELOG) corrective regimen sliding scale   SubCutaneous three times a day before meals  insulin lispro (ADMELOG) corrective regimen sliding scale   SubCutaneous at bedtime  lactated ringers. 1000 milliLiter(s) IV Continuous <Continuous>      PMHX/PSHX:  Diabetes    H/O: HTN (hypertension)    Hypothyroid    HLD (hyperlipidemia)    H/O  section      ROS:   General:  No wt loss, fevers, chills  ENT:  No dysphagia  CV:  No pain, palpitations  Pulm:  No dyspnea, cough  GI:  +pain, +nausea, +vomiting, No diarrhea, No constipation, No rectal bleeding, No tarry stools  Muscle:  No pain, weakness  Neuro:  No weakness  Heme:  No ecchymosis  Skin:  No rash    PHYSICAL EXAM:  GENERAL:  No acute distress  HEENT:  no scleral icterus  CHEST:  no accessory muscle use  HEART:  Regular rate and rhythm  ABDOMEN:  Soft, RUQ tenderness, non-distended  EXTREMITIES: No edema  SKIN:  No rash/ecchymoses  NEURO:  Alert and oriented x 3    Vital Signs:  Vital Signs Last 24 Hrs  T(C): 36.7 (11 Dec 2024 07:45), Max: 36.8 (11 Dec 2024 03:03)  T(F): 98.1 (11 Dec 2024 07:45), Max: 98.2 (11 Dec 2024 03:03)  HR: 70 (11 Dec 2024 07:45) (70 - 73)  BP: 134/66 (11 Dec 2024 07:45) (125/81 - 134/66)  BP(mean): --  RR: 15 (11 Dec 2024 07:45) (15 - 16)  SpO2: 100% (11 Dec 2024 07:45) (97% - 100%)    Parameters below as of 11 Dec 2024 07:45  Patient On (Oxygen Delivery Method): room air      Daily Height in cm: 165.1 (11 Dec 2024 03:03)    Daily     LABS:                        13.4   13.49 )-----------( 297      ( 11 Dec 2024 04:15 )             42.7     Mean Cell Volume: 84.6 fL (24 @ 04:15)        140  |  100  |  17  ----------------------------<  219[H]  4.2   |  23  |  0.73    Ca    9.8      11 Dec 2024 04:15    TPro  8.2  /  Alb  4.5  /  TBili  0.3  /  DBili  x   /  AST  15  /  ALT  14  /  AlkPhos  57      LIVER FUNCTIONS - ( 11 Dec 2024 04:15 )  Alb: 4.5 g/dL / Pro: 8.2 g/dL / ALK PHOS: 57 U/L / ALT: 14 U/L / AST: 15 U/L / GGT: x           PT/INR - ( 11 Dec 2024 04:15 )   PT: 12.1 sec;   INR: 1.04 ratio         PTT - ( 11 Dec 2024 04:15 )  PTT:34.0 sec  Urinalysis Basic - ( 11 Dec 2024 07:37 )    Color: Yellow / Appearance: Clear / S.044 / pH: x  Gluc: x / Ketone: 15 mg/dL  / Bili: Negative / Urobili: 0.2 mg/dL   Blood: x / Protein: Negative mg/dL / Nitrite: Negative   Leuk Esterase: Negative / RBC: x / WBC x   Sq Epi: x / Non Sq Epi: x / Bacteria: x      Amylase Serum--      Lipase serum41       Ammonia--                          13.4   13.49 )-----------( 297      ( 11 Dec 2024 04:15 )             42.7       Imaging:      < from: US Abdomen Upper Quadrant Right (12.11.24 @ 05:11) >  FINDINGS:  Liver: Within normal limits.  Bile ducts: Dilated. Common bile duct measures 8 mm.  Gallbladder: Cholelithiasis. Mild distention. Negative sonographic   Hughes's sign. No mural thickening or pericholecystic fluid.  Pancreas: Poorly visualized.  Right kidney: 11.5 cm. No hydronephrosis.  Ascites: None.  IVC: Visualized portions are within normal limits.    IMPRESSION:  Cholelithiasis without sonographic evidence of acute cholecystitis.    Dilated common bile duct. Recommend further evaluation with   contrast-enhanced MR abdomen/MRCP.    < end of copied text >

## 2024-12-12 ENCOUNTER — TRANSCRIPTION ENCOUNTER (OUTPATIENT)
Age: 64
End: 2024-12-12

## 2024-12-12 LAB
A1C WITH ESTIMATED AVERAGE GLUCOSE RESULT: 7 % — HIGH (ref 4–5.6)
ALBUMIN SERPL ELPH-MCNC: 3.4 G/DL — SIGNIFICANT CHANGE UP (ref 3.3–5)
ALP SERPL-CCNC: 43 U/L — SIGNIFICANT CHANGE UP (ref 40–120)
ALT FLD-CCNC: 13 U/L — SIGNIFICANT CHANGE UP (ref 4–33)
ANION GAP SERPL CALC-SCNC: 15 MMOL/L — HIGH (ref 7–14)
AST SERPL-CCNC: 18 U/L — SIGNIFICANT CHANGE UP (ref 4–32)
BILIRUB SERPL-MCNC: 0.3 MG/DL — SIGNIFICANT CHANGE UP (ref 0.2–1.2)
BUN SERPL-MCNC: 21 MG/DL — SIGNIFICANT CHANGE UP (ref 7–23)
CALCIUM SERPL-MCNC: 8.5 MG/DL — SIGNIFICANT CHANGE UP (ref 8.4–10.5)
CHLORIDE SERPL-SCNC: 104 MMOL/L — SIGNIFICANT CHANGE UP (ref 98–107)
CO2 SERPL-SCNC: 20 MMOL/L — LOW (ref 22–31)
CREAT SERPL-MCNC: 0.6 MG/DL — SIGNIFICANT CHANGE UP (ref 0.5–1.3)
EGFR: 100 ML/MIN/1.73M2 — SIGNIFICANT CHANGE UP
ESTIMATED AVERAGE GLUCOSE: 154 — SIGNIFICANT CHANGE UP
GLUCOSE BLDC GLUCOMTR-MCNC: 113 MG/DL — HIGH (ref 70–99)
GLUCOSE BLDC GLUCOMTR-MCNC: 120 MG/DL — HIGH (ref 70–99)
GLUCOSE BLDC GLUCOMTR-MCNC: 120 MG/DL — HIGH (ref 70–99)
GLUCOSE BLDC GLUCOMTR-MCNC: 132 MG/DL — HIGH (ref 70–99)
GLUCOSE BLDC GLUCOMTR-MCNC: 141 MG/DL — HIGH (ref 70–99)
GLUCOSE BLDC GLUCOMTR-MCNC: 88 MG/DL — SIGNIFICANT CHANGE UP (ref 70–99)
GLUCOSE SERPL-MCNC: 127 MG/DL — HIGH (ref 70–99)
HCT VFR BLD CALC: 37.2 % — SIGNIFICANT CHANGE UP (ref 34.5–45)
HGB BLD-MCNC: 11.9 G/DL — SIGNIFICANT CHANGE UP (ref 11.5–15.5)
MAGNESIUM SERPL-MCNC: 2 MG/DL — SIGNIFICANT CHANGE UP (ref 1.6–2.6)
MCHC RBC-ENTMCNC: 26.7 PG — LOW (ref 27–34)
MCHC RBC-ENTMCNC: 32 G/DL — SIGNIFICANT CHANGE UP (ref 32–36)
MCV RBC AUTO: 83.4 FL — SIGNIFICANT CHANGE UP (ref 80–100)
NRBC # BLD: 0 /100 WBCS — SIGNIFICANT CHANGE UP (ref 0–0)
NRBC # FLD: 0 K/UL — SIGNIFICANT CHANGE UP (ref 0–0)
PHOSPHATE SERPL-MCNC: 2.6 MG/DL — SIGNIFICANT CHANGE UP (ref 2.5–4.5)
PLATELET # BLD AUTO: 262 K/UL — SIGNIFICANT CHANGE UP (ref 150–400)
POTASSIUM SERPL-MCNC: 3.9 MMOL/L — SIGNIFICANT CHANGE UP (ref 3.5–5.3)
POTASSIUM SERPL-SCNC: 3.9 MMOL/L — SIGNIFICANT CHANGE UP (ref 3.5–5.3)
PROT SERPL-MCNC: 7 G/DL — SIGNIFICANT CHANGE UP (ref 6–8.3)
RBC # BLD: 4.46 M/UL — SIGNIFICANT CHANGE UP (ref 3.8–5.2)
RBC # FLD: 14.6 % — HIGH (ref 10.3–14.5)
SODIUM SERPL-SCNC: 139 MMOL/L — SIGNIFICANT CHANGE UP (ref 135–145)
WBC # BLD: 10.94 K/UL — HIGH (ref 3.8–10.5)
WBC # FLD AUTO: 10.94 K/UL — HIGH (ref 3.8–10.5)

## 2024-12-12 PROCEDURE — 74183 MRI ABD W/O CNTR FLWD CNTR: CPT | Mod: 26

## 2024-12-12 PROCEDURE — 99232 SBSQ HOSP IP/OBS MODERATE 35: CPT | Mod: GC

## 2024-12-12 RX ORDER — POLYETHYLENE GLYCOL 3350 17 G/17G
17 POWDER, FOR SOLUTION ORAL ONCE
Refills: 0 | Status: COMPLETED | OUTPATIENT
Start: 2024-12-12 | End: 2024-12-12

## 2024-12-12 RX ORDER — POLYETHYLENE GLYCOL 3350 17 G/17G
17 POWDER, FOR SOLUTION ORAL DAILY
Refills: 0 | Status: DISCONTINUED | OUTPATIENT
Start: 2024-12-12 | End: 2024-12-18

## 2024-12-12 RX ORDER — SODIUM,POTASSIUM PHOSPHATES 278-250MG
1 POWDER IN PACKET (EA) ORAL ONCE
Refills: 0 | Status: COMPLETED | OUTPATIENT
Start: 2024-12-12 | End: 2024-12-12

## 2024-12-12 RX ORDER — KETOROLAC TROMETHAMINE 30 MG/ML
15 INJECTION INTRAMUSCULAR; INTRAVENOUS ONCE
Refills: 0 | Status: DISCONTINUED | OUTPATIENT
Start: 2024-12-12 | End: 2024-12-12

## 2024-12-12 RX ORDER — MAGNESIUM, ALUMINUM HYDROXIDE 200-225/5
30 SUSPENSION, ORAL (FINAL DOSE FORM) ORAL ONCE
Refills: 0 | Status: COMPLETED | OUTPATIENT
Start: 2024-12-12 | End: 2024-12-12

## 2024-12-12 RX ORDER — SENNOSIDES 8.6 MG
2 TABLET ORAL AT BEDTIME
Refills: 0 | Status: DISCONTINUED | OUTPATIENT
Start: 2024-12-12 | End: 2024-12-18

## 2024-12-12 RX ORDER — ACETAMINOPHEN 500MG 500 MG/1
1000 TABLET, COATED ORAL ONCE
Refills: 0 | Status: COMPLETED | OUTPATIENT
Start: 2024-12-12 | End: 2024-12-12

## 2024-12-12 RX ORDER — INSULIN GLARGINE 100 [IU]/ML
12 INJECTION, SOLUTION SUBCUTANEOUS
Refills: 0 | DISCHARGE

## 2024-12-12 RX ORDER — POTASSIUM CHLORIDE 600 MG/1
20 TABLET, EXTENDED RELEASE ORAL ONCE
Refills: 0 | Status: COMPLETED | OUTPATIENT
Start: 2024-12-12 | End: 2024-12-12

## 2024-12-12 RX ORDER — ACETAMINOPHEN 500MG 500 MG/1
650 TABLET, COATED ORAL ONCE
Refills: 0 | Status: COMPLETED | OUTPATIENT
Start: 2024-12-12 | End: 2024-12-12

## 2024-12-12 RX ADMIN — Medication 100 MILLILITER(S): at 01:35

## 2024-12-12 RX ADMIN — Medication 112 MICROGRAM(S): at 05:25

## 2024-12-12 RX ADMIN — POTASSIUM CHLORIDE 20 MILLIEQUIVALENT(S): 600 TABLET, EXTENDED RELEASE ORAL at 09:06

## 2024-12-12 RX ADMIN — KETOROLAC TROMETHAMINE 15 MILLIGRAM(S): 30 INJECTION INTRAMUSCULAR; INTRAVENOUS at 18:57

## 2024-12-12 RX ADMIN — PANTOPRAZOLE SODIUM 40 MILLIGRAM(S): 40 TABLET, DELAYED RELEASE ORAL at 05:25

## 2024-12-12 RX ADMIN — ROSUVASTATIN CALCIUM 5 MILLIGRAM(S): 5 TABLET, FILM COATED ORAL at 21:40

## 2024-12-12 RX ADMIN — POLYETHYLENE GLYCOL 3350 17 GRAM(S): 17 POWDER, FOR SOLUTION ORAL at 18:57

## 2024-12-12 RX ADMIN — ACETAMINOPHEN 500MG 650 MILLIGRAM(S): 500 TABLET, COATED ORAL at 05:25

## 2024-12-12 RX ADMIN — INSULIN GLARGINE 6 UNIT(S): 100 INJECTION, SOLUTION SUBCUTANEOUS at 21:40

## 2024-12-12 RX ADMIN — ACETAMINOPHEN 500MG 400 MILLIGRAM(S): 500 TABLET, COATED ORAL at 18:56

## 2024-12-12 RX ADMIN — Medication 81 MILLIGRAM(S): at 11:55

## 2024-12-12 RX ADMIN — POLYETHYLENE GLYCOL 3350 17 GRAM(S): 17 POWDER, FOR SOLUTION ORAL at 16:02

## 2024-12-12 RX ADMIN — ACETAMINOPHEN 500MG 1000 MILLIGRAM(S): 500 TABLET, COATED ORAL at 19:26

## 2024-12-12 RX ADMIN — Medication 30 MILLILITER(S): at 11:55

## 2024-12-12 RX ADMIN — KETOROLAC TROMETHAMINE 15 MILLIGRAM(S): 30 INJECTION INTRAMUSCULAR; INTRAVENOUS at 19:26

## 2024-12-12 RX ADMIN — METOPROLOL TARTRATE 50 MILLIGRAM(S): 100 TABLET, FILM COATED ORAL at 06:13

## 2024-12-12 RX ADMIN — ACETAMINOPHEN 500MG 650 MILLIGRAM(S): 500 TABLET, COATED ORAL at 06:25

## 2024-12-12 RX ADMIN — Medication 1 PACKET(S): at 09:07

## 2024-12-12 RX ADMIN — Medication 100 MILLILITER(S): at 16:02

## 2024-12-12 RX ADMIN — ENOXAPARIN SODIUM 40 MILLIGRAM(S): 30 INJECTION SUBCUTANEOUS at 18:56

## 2024-12-12 RX ADMIN — Medication 2 TABLET(S): at 21:40

## 2024-12-12 RX ADMIN — LISINOPRIL 20 MILLIGRAM(S): 20 TABLET ORAL at 06:13

## 2024-12-12 NOTE — DISCHARGE NOTE PROVIDER - NSDCCPTREATMENT_GEN_ALL_CORE_FT
PRINCIPAL PROCEDURE  Procedure: US abdomen RUQ  Findings and Treatment:       SECONDARY PROCEDURE  Procedure: MR MRCP  Findings and Treatment:      PRINCIPAL PROCEDURE  Procedure: US abdomen RUQ  Findings and Treatment: FINDINGS:  Liver: Within normal limits.  Bile ducts: Dilated. Common bile duct measures 8 mm.  Gallbladder: Cholelithiasis. Mild distention. Negative sonographic   Hughes's sign. No mural thickening or pericholecystic fluid.  Pancreas: Poorly visualized.  Right kidney: 11.5 cm. No hydronephrosis.  Ascites: None.  IVC: Visualized portions are within normal limits.  IMPRESSION:  Cholelithiasis without sonographic evidence of acute cholecystitis.  Dilated common bile duct. Recommend further evaluation with   contrast-enhanced MR abdomen/MRCP.        SECONDARY PROCEDURE  Procedure: MR MRCP  Findings and Treatment: IMPRESSION:  Diffuse dilatation of the common bile duct to the level of the ampulla of   Vater of uncertain etiology. No choledocholithiasis or pancreatic head   mass. Mild intrahepatic biliary dilatation.  Findings equivocal for acute cholecystitis. Correlate clinically and   consider HIDA scan for further evaluation.

## 2024-12-12 NOTE — PROGRESS NOTE ADULT - SUBJECTIVE AND OBJECTIVE BOX
JIGAR WATERS  64y  MRN: [id number]    Patient is a 64y old  Female who presents with a chief complaint of Abdominal pain (11 Dec 2024 09:06)      Interval/Overnight Events: no events ON.     Subjective: Pt seen and examined at bedside. Denies fever, CP, SOB, abn pain, N/V, dysuria. Tolerating diet.      MEDICATIONS  (STANDING):  aspirin enteric coated 81 milliGRAM(s) Oral daily  dextrose 5%. 1000 milliLiter(s) (100 mL/Hr) IV Continuous <Continuous>  dextrose 5%. 1000 milliLiter(s) (50 mL/Hr) IV Continuous <Continuous>  dextrose 50% Injectable 25 Gram(s) IV Push once  dextrose 50% Injectable 12.5 Gram(s) IV Push once  dextrose 50% Injectable 25 Gram(s) IV Push once  enoxaparin Injectable 40 milliGRAM(s) SubCutaneous every 24 hours  glucagon  Injectable 1 milliGRAM(s) IntraMuscular once  insulin glargine Injectable (LANTUS) 6 Unit(s) SubCutaneous at bedtime  insulin lispro (ADMELOG) corrective regimen sliding scale   SubCutaneous every 4 hours  lactated ringers. 1000 milliLiter(s) (100 mL/Hr) IV Continuous <Continuous>  levothyroxine 112 MICROGram(s) Oral daily  lisinopril 20 milliGRAM(s) Oral daily  metoprolol succinate ER 50 milliGRAM(s) Oral daily  pantoprazole    Tablet 40 milliGRAM(s) Oral before breakfast  rosuvastatin 5 milliGRAM(s) Oral at bedtime    MEDICATIONS  (PRN):  dextrose Oral Gel 15 Gram(s) Oral once PRN Blood Glucose LESS THAN 70 milliGRAM(s)/deciliter  metoclopramide Injectable 5 milliGRAM(s) IV Push every 8 hours PRN n/v      Objective:    Vitals: Vital Signs Last 24 Hrs  T(C): 37 (24 @ 05:38), Max: 37.1 (24 @ 09:17)  T(F): 98.6 (24 @ 05:38), Max: 98.7 (24 @ 09:17)  HR: 72 (24 @ 05:38) (70 - 93)  BP: 138/64 (24 @ 05:38) (113/58 - 138/64)  BP(mean): --  RR: 17 (24 @ 05:38) (15 - 17)  SpO2: 100% (24 @ 05:38) (95% - 100%)                I&O's Summary      PHYSICAL EXAM:  GENERAL: NAD  HEAD:  Atraumatic, Normocephalic  EYES: EOMI, conjunctiva and sclera clear  CHEST/LUNG: Clear to auscultation bilaterally; No rales, rhonchi, wheezing, or rubs  HEART: Regular rate and rhythm; No murmurs, rubs, or gallops  ABDOMEN: Soft, Nontender, Nondistended;   SKIN: No rashes or lesions  NERVOUS SYSTEM:  Alert & Oriented X3, no focal deficits    LABS:                        13.4   13.49 )-----------( 297      ( 11 Dec 2024 04:15 )             42.7         140  |  100  |  17  ----------------------------<  219[H]  4.2   |  23  |  0.73    Ca    9.8      11 Dec 2024 04:15    TPro  8.2  /  Alb  4.5  /  TBili  0.3  /  DBili  x   /  AST  15  /  ALT  14  /  AlkPhos  57      CAPILLARY BLOOD GLUCOSE      POCT Blood Glucose.: 120 mg/dL (12 Dec 2024 06:20)  POCT Blood Glucose.: 88 mg/dL (12 Dec 2024 02:16)  POCT Blood Glucose.: 95 mg/dL (11 Dec 2024 23:18)  POCT Blood Glucose.: 76 mg/dL (11 Dec 2024 21:06)  POCT Blood Glucose.: 84 mg/dL (11 Dec 2024 17:47)  POCT Blood Glucose.: 127 mg/dL (11 Dec 2024 12:13)  POCT Blood Glucose.: 99 mg/dL (11 Dec 2024 10:13)  POCT Blood Glucose.: 144 mg/dL (11 Dec 2024 07:41)    PT/INR - ( 11 Dec 2024 04:15 )   PT: 12.1 sec;   INR: 1.04 ratio         PTT - ( 11 Dec 2024 04:15 )  PTT:34.0 sec    Urinalysis Basic - ( 11 Dec 2024 07:37 )    Color: Yellow / Appearance: Clear / S.044 / pH: x  Gluc: x / Ketone: 15 mg/dL  / Bili: Negative / Urobili: 0.2 mg/dL   Blood: x / Protein: Negative mg/dL / Nitrite: Negative   Leuk Esterase: Negative / RBC: x / WBC x   Sq Epi: x / Non Sq Epi: x / Bacteria: x          RADIOLOGY & ADDITIONAL TESTS:    Imaging Personally Reviewed:  [x ] YES  [ ] NO    Consultants involved in case:   Consultant(s) Notes Reviewed:  [ x] YES  [ ] NO:   Care Discussed with Consultants/Other Providers [x ] YES  [ ] NO         JIGAR WATERS  64y  MRN: [id number]    Patient is a 64y old  Female who presents with a chief complaint of Abdominal pain (11 Dec 2024 09:06)      Interval/Overnight Events: no events ON.     Subjective: Pt seen and examined at bedside. She says RUQ is about the same, but improves with tylenol. Endorses no more nausea and vomiting. She is experiencing a new substernal burning sensation but thinks it may be due to her not eating. Has no sour taste in mouth associated with burning sensation.  Denies fever, SOB, dysuria, or dizziness.     MEDICATIONS  (STANDING):  aspirin enteric coated 81 milliGRAM(s) Oral daily  dextrose 5%. 1000 milliLiter(s) (100 mL/Hr) IV Continuous <Continuous>  dextrose 5%. 1000 milliLiter(s) (50 mL/Hr) IV Continuous <Continuous>  dextrose 50% Injectable 25 Gram(s) IV Push once  dextrose 50% Injectable 12.5 Gram(s) IV Push once  dextrose 50% Injectable 25 Gram(s) IV Push once  enoxaparin Injectable 40 milliGRAM(s) SubCutaneous every 24 hours  glucagon  Injectable 1 milliGRAM(s) IntraMuscular once  insulin glargine Injectable (LANTUS) 6 Unit(s) SubCutaneous at bedtime  insulin lispro (ADMELOG) corrective regimen sliding scale   SubCutaneous every 4 hours  lactated ringers. 1000 milliLiter(s) (100 mL/Hr) IV Continuous <Continuous>  levothyroxine 112 MICROGram(s) Oral daily  lisinopril 20 milliGRAM(s) Oral daily  metoprolol succinate ER 50 milliGRAM(s) Oral daily  pantoprazole    Tablet 40 milliGRAM(s) Oral before breakfast  rosuvastatin 5 milliGRAM(s) Oral at bedtime    MEDICATIONS  (PRN):  dextrose Oral Gel 15 Gram(s) Oral once PRN Blood Glucose LESS THAN 70 milliGRAM(s)/deciliter  metoclopramide Injectable 5 milliGRAM(s) IV Push every 8 hours PRN n/v      Objective:    Vitals: Vital Signs Last 24 Hrs  T(C): 37 (12-12-24 @ 05:38), Max: 37.1 (12-11-24 @ 09:17)  T(F): 98.6 (12-12-24 @ 05:38), Max: 98.7 (12-11-24 @ 09:17)  HR: 72 (12-12-24 @ 05:38) (70 - 93)  BP: 138/64 (12-12-24 @ 05:38) (113/58 - 138/64)  BP(mean): --  RR: 17 (12-12-24 @ 05:38) (15 - 17)  SpO2: 100% (12-12-24 @ 05:38) (95% - 100%)                I&O's Summary      PHYSICAL EXAM:  GENERAL: NAD  HEAD:  Atraumatic, Normocephalic  EYES: EOMI, conjunctiva and sclera clear  CHEST/LUNG: Clear to auscultation bilaterally; No rales, rhonchi, wheezing, or rubs  HEART: Regular rate and rhythm; No murmurs, rubs, or gallops  ABDOMEN: Soft, nondistended;  Nontender to palpation in all 4 quadrants.   SKIN: No rashes or lesions  NERVOUS SYSTEM:  Alert & Oriented X3, no focal deficits    EKG: personally reviewed-    LABS: personally reviewed                        11.9   10.94 )-----------( 262      ( 12 Dec 2024 06:05 )             37.2     12-12    139  |  104  |  21  ----------------------------<  127[H]  3.9   |  20[L]  |  0.60    Ca    8.5      12 Dec 2024 06:05  Phos  2.6     12-12  Mg     2.00     12-12    TPro  7.0  /  Alb  3.4  /  TBili  0.3  /  DBili  x   /  AST  18  /  ALT  13  /  AlkPhos  43  12-12    PT/INR - ( 11 Dec 2024 04:15 )   PT: 12.1 sec;   INR: 1.04 ratio         PTT - ( 11 Dec 2024 04:15 )  PTT:34.0 sec  Urinalysis Basic - ( 12 Dec 2024 06:05 )    Color: x / Appearance: x / SG: x / pH: x  Gluc: 127 mg/dL / Ketone: x  / Bili: x / Urobili: x   Blood: x / Protein: x / Nitrite: x   Leuk Esterase: x / RBC: x / WBC x   Sq Epi: x / Non Sq Epi: x / Bacteria: x          IMAGING: personally reviewed  TTE    1. Left ventricular systolic function is normal with an ejection fraction of 63 % by 3D.   2. Normal right ventricular cavity size, with normal wall thickness, and normal right ventricular systolic function. Tricuspid annular plane systolic excursion (TAPSE) is 2.4 cm (normal >=1.7 cm).   3. Normal left and right atrial size.   4. No significant valvular disease.   5. Organized fibrinous vs coagulant material is seen in the pericardial space. There is a small pericardial effusion.          Consultants involved in case:   Consultant(s) Notes Reviewed:  [ x] YES  [ ] NO:   Care Discussed with Consultants/Other Providers [x ] YES  [ ] NO         JIGAR WATERS  64y  MRN: [id number]    Patient is a 64y old  Female who presents with a chief complaint of Abdominal pain (11 Dec 2024 09:06)      Interval/Overnight Events: no events ON.     Subjective: Pt seen and examined at bedside. RUQ pain has improved. Endorses no more nausea and vomiting. She is experiencing a new substernal burning sensation but thinks it may be due to her not eating. Has no sour taste in mouth associated with burning sensation.  Denies fever, SOB, dysuria, or dizziness.     MEDICATIONS  (STANDING):  aspirin enteric coated 81 milliGRAM(s) Oral daily  dextrose 5%. 1000 milliLiter(s) (100 mL/Hr) IV Continuous <Continuous>  dextrose 5%. 1000 milliLiter(s) (50 mL/Hr) IV Continuous <Continuous>  dextrose 50% Injectable 25 Gram(s) IV Push once  dextrose 50% Injectable 12.5 Gram(s) IV Push once  dextrose 50% Injectable 25 Gram(s) IV Push once  enoxaparin Injectable 40 milliGRAM(s) SubCutaneous every 24 hours  glucagon  Injectable 1 milliGRAM(s) IntraMuscular once  insulin glargine Injectable (LANTUS) 6 Unit(s) SubCutaneous at bedtime  insulin lispro (ADMELOG) corrective regimen sliding scale   SubCutaneous every 4 hours  lactated ringers. 1000 milliLiter(s) (100 mL/Hr) IV Continuous <Continuous>  levothyroxine 112 MICROGram(s) Oral daily  lisinopril 20 milliGRAM(s) Oral daily  metoprolol succinate ER 50 milliGRAM(s) Oral daily  pantoprazole    Tablet 40 milliGRAM(s) Oral before breakfast  rosuvastatin 5 milliGRAM(s) Oral at bedtime    MEDICATIONS  (PRN):  dextrose Oral Gel 15 Gram(s) Oral once PRN Blood Glucose LESS THAN 70 milliGRAM(s)/deciliter  metoclopramide Injectable 5 milliGRAM(s) IV Push every 8 hours PRN n/v      Objective:    Vitals: Vital Signs Last 24 Hrs  T(C): 37 (12-12-24 @ 05:38), Max: 37.1 (12-11-24 @ 09:17)  T(F): 98.6 (12-12-24 @ 05:38), Max: 98.7 (12-11-24 @ 09:17)  HR: 72 (12-12-24 @ 05:38) (70 - 93)  BP: 138/64 (12-12-24 @ 05:38) (113/58 - 138/64)  BP(mean): --  RR: 17 (12-12-24 @ 05:38) (15 - 17)  SpO2: 100% (12-12-24 @ 05:38) (95% - 100%)                I&O's Summary      PHYSICAL EXAM:  GENERAL: NAD  HEAD:  Atraumatic, Normocephalic  EYES: EOMI, conjunctiva and sclera clear  CHEST/LUNG: Clear to auscultation bilaterally; No rales, rhonchi, wheezing, or rubs  HEART: Regular rate and rhythm; No murmurs, rubs, or gallops  ABDOMEN: Soft, nondistended;  Nontender to palpation in all 4 quadrants.   SKIN: No rashes or lesions  NERVOUS SYSTEM:  Alert & Oriented X3, no focal deficits    EKG: personally reviewed-    LABS: personally reviewed                        11.9   10.94 )-----------( 262      ( 12 Dec 2024 06:05 )             37.2     12-12    139  |  104  |  21  ----------------------------<  127[H]  3.9   |  20[L]  |  0.60    Ca    8.5      12 Dec 2024 06:05  Phos  2.6     12-12  Mg     2.00     12-12    TPro  7.0  /  Alb  3.4  /  TBili  0.3  /  DBili  x   /  AST  18  /  ALT  13  /  AlkPhos  43  12-12    PT/INR - ( 11 Dec 2024 04:15 )   PT: 12.1 sec;   INR: 1.04 ratio         PTT - ( 11 Dec 2024 04:15 )  PTT:34.0 sec  Urinalysis Basic - ( 12 Dec 2024 06:05 )    Color: x / Appearance: x / SG: x / pH: x  Gluc: 127 mg/dL / Ketone: x  / Bili: x / Urobili: x   Blood: x / Protein: x / Nitrite: x   Leuk Esterase: x / RBC: x / WBC x   Sq Epi: x / Non Sq Epi: x / Bacteria: x          IMAGING: personally reviewed  TTE    1. Left ventricular systolic function is normal with an ejection fraction of 63 % by 3D.   2. Normal right ventricular cavity size, with normal wall thickness, and normal right ventricular systolic function. Tricuspid annular plane systolic excursion (TAPSE) is 2.4 cm (normal >=1.7 cm).   3. Normal left and right atrial size.   4. No significant valvular disease.   5. Organized fibrinous vs coagulant material is seen in the pericardial space. There is a small pericardial effusion.          Consultants involved in case:   Consultant(s) Notes Reviewed:  [ x] YES  [ ] NO:   Care Discussed with Consultants/Other Providers [x ] YES  [ ] NO

## 2024-12-12 NOTE — PROGRESS NOTE ADULT - PROBLEM SELECTOR PLAN 2
Hx of DM2 on insulin (12 U lantus qhs), metformin 1000 mg, Jardiance 10 mg, Glimepiride 4 mg at home.     Plan  - hold oral medications  - begin basal 6 U QHS (NPO)  - SSI q6h while NPO  - FS q6h while NPO

## 2024-12-12 NOTE — DISCHARGE NOTE PROVIDER - NSDCMRMEDTOKEN_GEN_ALL_CORE_FT
aspirin 81 mg oral capsule: 1 cap(s) orally once a day  Crestor 5 mg oral tablet: 1 tab(s) orally once a day  glimepiride 4 mg oral tablet: 1 tab(s) orally  Jardiance 10 mg oral tablet: 1 tab(s) orally  levothyroxine 112 mcg (0.112 mg) oral tablet: 1 tab(s) orally once a day  metFORMIN 1000 mg oral tablet: 1 tab(s) orally 2 times a day  metoprolol: 50 milligram(s) orally once a day  pantoprazole 40 mg oral delayed release tablet: 1 tab(s) orally once a day  ramipril 5 mg oral capsule: 1 cap(s) orally   aspirin 81 mg oral capsule: 1 cap(s) orally once a day  Crestor 5 mg oral tablet: 1 tab(s) orally once a day  glimepiride 4 mg oral tablet: 1 tab(s) orally  Jardiance 10 mg oral tablet: 1 tab(s) orally  Lantus 100 units/mL subcutaneous solution: 12 unit(s) subcutaneous once a day (at bedtime)  levothyroxine 112 mcg (0.112 mg) oral tablet: 1 tab(s) orally once a day  metFORMIN 1000 mg oral tablet: 1 tab(s) orally 2 times a day  metoprolol: 50 milligram(s) orally once a day  pantoprazole 40 mg oral delayed release tablet: 1 tab(s) orally once a day  ramipril 5 mg oral capsule: 1 cap(s) orally   aspirin 81 mg oral capsule: 1 cap(s) orally once a day  Crestor 5 mg oral tablet: 1 tab(s) orally once a day  glimepiride 4 mg oral tablet: 1 tab(s) orally  Jardiance 10 mg oral tablet: 1 tab(s) orally  Lantus 100 units/mL subcutaneous solution: 12 unit(s) subcutaneous once a day (at bedtime)  levothyroxine 112 mcg (0.112 mg) oral tablet: 1 tab(s) orally once a day  metFORMIN 1000 mg oral tablet: 1 tab(s) orally 2 times a day  metoprolol succinate 50 mg oral tablet, extended release: 1 tab(s) orally once a day  pantoprazole 40 mg oral delayed release tablet: 1 tab(s) orally once a day  ramipril 5 mg oral capsule: 1 cap(s) orally   acetaminophen 325 mg oral tablet: 2 tab(s) orally every 6 hours as needed for  mild pain  aspirin 81 mg oral capsule: 1 cap(s) orally once a day  Crestor 5 mg oral tablet: 1 tab(s) orally once a day  glimepiride 4 mg oral tablet: 1 tab(s) orally  Jardiance 10 mg oral tablet: 1 tab(s) orally  Lantus 100 units/mL subcutaneous solution: 12 unit(s) subcutaneous once a day (at bedtime)  levothyroxine 112 mcg (0.112 mg) oral tablet: 1 tab(s) orally once a day  metFORMIN 1000 mg oral tablet: 1 tab(s) orally 2 times a day  metoprolol succinate 50 mg oral tablet, extended release: 1 tab(s) orally once a day  oxyCODONE 5 mg oral tablet: 0.5 tab(s) orally 4 times a day as needed for  severe pain MDD: 10  pantoprazole 40 mg oral delayed release tablet: 1 tab(s) orally once a day  ramipril 5 mg oral capsule: 1 cap(s) orally

## 2024-12-12 NOTE — DISCHARGE NOTE PROVIDER - CARE PROVIDERS DIRECT ADDRESSES
,DirectAddress_Unknown ,DirectAddress_Unknown,[email or web address]

## 2024-12-12 NOTE — PROGRESS NOTE ADULT - ASSESSMENT
Vero Soto is a 65 yo F with a PMHx of DM2 on insulin, HTN, HLD, hypothyroidism presenting with acute RUQ pain associated with nausea and vomiting. Physical exam is notable for RUQ tenderness to palpation, and labs are notable for leukocytosis of 13.5, and + glucose and ketones in urine. DDx includes gastritis/gastroenteritis vs. choledocholithiasis vs. MI  Name band; Vero Soto is a 65 yo F with a PMHx of DM2 on insulin, HTN, HLD, hypothyroidism presenting with acute RUQ pain associated with nausea and vomiting. Physical exam is notable for RUQ tenderness to palpation, and labs are notable for leukocytosis of 13.5, and + glucose and ketones in urine. DDx includes gastritis/gastroenteritis vs. choledocholithiasis

## 2024-12-12 NOTE — PROGRESS NOTE ADULT - PROBLEM SELECTOR PLAN 1
RUQ pain associated with N/V. + leukocytosis of 13.49. US in ED revealed cholelithaisis w/o evidence of acute cholecystitis and dilated common bile duct    Plan  - NPO  - f/u GI recs  - Obtain MRCP   - GI PCR if has diarrhea   - obtain troponin to r/o atypical CP RUQ pain associated with N/V. + leukocytosis of 13.49. US in ED revealed cholelithaisis w/o evidence of acute cholecystitis and dilated common bile duct  Troponin: <6    Plan  - NPO  - GI recs - Obtain MRCP   - GI PCR if has diarrhea RUQ pain associated with N/V. + leukocytosis of 13.49. US in ED revealed cholelithaisis w/o evidence of acute cholecystitis and dilated common bile duct  Troponin: <6    Plan  - NPO  - GI recs - Obtain MRCP   - GI PCR if has diarrhea  - soft diet after MRCP

## 2024-12-12 NOTE — DISCHARGE NOTE PROVIDER - NSFOLLOWUPCLINICS_GEN_ALL_ED_FT
[facility]  Gastroenterology  [address] [place] [id number]  Phone: [phone]  Fax:   Follow Up Time: 1 month

## 2024-12-12 NOTE — DISCHARGE NOTE PROVIDER - NSDCCPCAREPLAN_GEN_ALL_CORE_FT
PRINCIPAL DISCHARGE DIAGNOSIS  Diagnosis: Dilated bile duct  Assessment and Plan of Treatment: You were admitted to the hospital due to abominal pain, nausea and vomiting. You started experiencing these symptoms a few hours after you ate your dinner. On initial imaging in the ER, we got an ultrasound of your belly that showed some stones with some dilation of certain components of the bile system that goes from the liver into your gallbladder. The GI doctors recommended an additional test called an MRCP. The MRCP showed the same dilation of the bile duct in the liver. However, you were tolerating a liquid diet with improvement in your nausea and vomiting. The GI doctors recommended 1 month follow up with your PCP to reassess.   Please follow up with your PCP with in two weeks after discharge. There are no medication changes as a result of this hospital stay.   Please seek medical attention if you experience any of the following symptoms: fever/chills, abdominal pain that is localized to the right side, severe itching, nausea, vomiting and diarrhea.     PRINCIPAL DISCHARGE DIAGNOSIS  Diagnosis: Dilated bile duct  Assessment and Plan of Treatment: You were admitted to the hospital due to abominal pain, nausea and vomiting. You started experiencing these symptoms a few hours after you ate your dinner. On initial imaging in the ER, we got an ultrasound of your belly that showed some stones with some dilation of certain components of the bile system that goes from the liver into your gallbladder. The GI doctors recommended an additional test called an MRCP. The MRCP showed the same dilation of the bile duct in the liver. However, you were tolerating a liquid diet with improvement in your nausea and vomiting. The GI doctors recommended 1 month follow up with GI to reassess.   Please follow up with your PCP with in two weeks after discharge. There are no medication changes as a result of this hospital stay.   Follow up with GI in 1 month of discharge for re-evaluation of your gallbladder and liver.   Please seek medical attention if you experience any of the following symptoms: fever/chills, abdominal pain that is localized to the right side, severe itching, nausea, vomiting and diarrhea.     PRINCIPAL DISCHARGE DIAGNOSIS  Diagnosis: Dilated bile duct  Assessment and Plan of Treatment: You were admitted to the hospital due to abominal pain, nausea and vomiting. You started experiencing these symptoms a few hours after you ate your dinner. On initial imaging in the ER, we got an ultrasound of your belly that showed some stones with some dilation of certain components of the bile system that goes from the liver into your gallbladder. The GI doctors recommended an additional test called an MRCP. The MRCP showed the same dilation of the bile duct in the liver. However, you were tolerating a liquid diet with improvement in your nausea and vomiting. The GI doctors recommended 1 month follow up with GI to reassess.   Please follow up with your PCP with in two weeks after discharge. There are no medication changes as a result of this hospital stay.   Follow up with GI in 1 month of discharge for re-evaluation of your gallbladder and liver.   Please seek medical attention if you experience any of the following symptoms: fever/chills, abdominal pain that is localized to the right side, severe itching, nausea, vomiting and diarrhea.      SECONDARY DISCHARGE DIAGNOSES  Diagnosis: Pericardial effusion  Assessment and Plan of Treatment: On ultrasound of the heart, we noticed a small amout of fluid around the heart called a pericardial effusion. The amount of fluid was small and is unlikely to be the etiology of your pain. Please follow up with your primary doctor and have a repeat echocardiogram in the coming 1-2 weeks to monitor the amount of fluid.     PRINCIPAL DISCHARGE DIAGNOSIS  Diagnosis: Dilated bile duct  Assessment and Plan of Treatment: You were admitted to the hospital due to abominal pain, nausea and vomiting. You started experiencing these symptoms a few hours after you ate your dinner. On initial imaging in the ER, we got an ultrasound of your belly that showed some stones with some dilation of certain components of the bile system that goes from the liver into your gallbladder. The GI doctors recommended an additional test called an MRCP. The MRCP showed the same dilation of the bile duct in the liver. However, you were tolerating a liquid diet with improvement in your nausea and vomiting. RUQ pain recurred and you were taken for EUS and ERCP which showed evidence of prior choledocolithiasis (or stone in the bile duct) but no stone currently, suggesting that you already had passed a stone. Surgey took you to the OR for successful robotic assisted laparoscopic cholycystectomy, or removal of the gallbladder. After the procedure you are doing well, tolerating diet, and passing flatus, so it is okay at this time to return home to continue your recovery.   Please follow up with your PCP with in two weeks after discharge. There are no medication changes as a result of this hospital stay.   Follow up with GI in 1 month of discharge for re-evaluation of your gallbladder and liver.   Please seek medical attention if you experience any of the following symptoms: fever/chills, abdominal pain that is localized to the right side, severe itching, nausea, vomiting and diarrhea.      SECONDARY DISCHARGE DIAGNOSES  Diagnosis: Pericardial effusion  Assessment and Plan of Treatment: On ultrasound of the heart, we noticed a small amout of fluid around the heart called a pericardial effusion. The amount of fluid was small and is unlikely to be the etiology of your pain. Please follow up with your primary doctor and have a repeat echocardiogram in the coming 1-2 weeks to monitor the amount of fluid.

## 2024-12-12 NOTE — PROGRESS NOTE ADULT - PROBLEM SELECTOR PLAN 5
Hx of hypothyroid    Plan  - TSH, T3/T4  - f/u levothyroxine dosage Hx of hypothyroid    Plan  - TSH, T3/T4  - c/w levothyroxine 112

## 2024-12-12 NOTE — DISCHARGE NOTE PROVIDER - NSDCFUADDAPPT_GEN_ALL_CORE_FT
APPTS ARE READY TO BE MADE: [X] YES    Best Family or Patient Contact (if needed):    Additional Information about above appointments (if needed):    1: [facility] [place]   2:   3:     Other comments or requests:    APPTS ARE READY TO BE MADE: [X] YES    Best Family or Patient Contact (if needed):    Additional Information about above appointments (if needed):    1: [facility] [place]   2: [facility] Gen [facility]  3:     Other comments or requests:    APPTS ARE READY TO BE MADE: [X] YES    Best Family or Patient Contact (if needed):    Additional Information about above appointments (if needed):    1: [facility]  [facility] [place]   2: [facility] Gen S[facility]  3:     Other comments or requests:     Patient advised they did not want to proceed with scheduling appointments with the providers on their referrals. They will coordinate care on their own.

## 2024-12-12 NOTE — DISCHARGE NOTE PROVIDER - CARE PROVIDER_API CALL
Juanita Ramon  Internal Medicine  [address] [place] [address]  Phone: [phone]  Fax: ()-  Follow Up Time: 2 weeks   Juanita Ramon  Internal Medicine  [address] [place] [address]  Phone: [phone]  Fax: ()-  Follow Up Time: 2 weeks    Palomo See  Surgery  [address] [place] [id number]  Phone: [phone]  Fax: [phone]  Established Patient  Follow Up Time: 2 weeks

## 2024-12-13 ENCOUNTER — TRANSCRIPTION ENCOUNTER (OUTPATIENT)
Age: 64
End: 2024-12-13

## 2024-12-13 LAB
ADD ON TEST-SPECIMEN IN LAB: SIGNIFICANT CHANGE UP
ALBUMIN SERPL ELPH-MCNC: 3.6 G/DL — SIGNIFICANT CHANGE UP (ref 3.3–5)
ALP SERPL-CCNC: 135 U/L — HIGH (ref 40–120)
ALP SERPL-CCNC: 48 U/L — SIGNIFICANT CHANGE UP (ref 40–120)
ALT FLD-CCNC: 10 U/L — SIGNIFICANT CHANGE UP (ref 4–33)
ALT FLD-CCNC: 65 U/L — HIGH (ref 4–33)
ANION GAP SERPL CALC-SCNC: 10 MMOL/L — SIGNIFICANT CHANGE UP (ref 7–14)
AST SERPL-CCNC: 144 U/L — HIGH (ref 4–32)
AST SERPL-CCNC: 21 U/L — SIGNIFICANT CHANGE UP (ref 4–32)
BILIRUB DIRECT SERPL-MCNC: 0.5 MG/DL — HIGH (ref 0–0.3)
BILIRUB DIRECT SERPL-MCNC: <0.2 MG/DL — SIGNIFICANT CHANGE UP (ref 0–0.3)
BILIRUB INDIRECT FLD-MCNC: 0.1 MG/DL — SIGNIFICANT CHANGE UP (ref 0–1)
BILIRUB SERPL-MCNC: 0.3 MG/DL — SIGNIFICANT CHANGE UP (ref 0.2–1.2)
BILIRUB SERPL-MCNC: 0.6 MG/DL — SIGNIFICANT CHANGE UP (ref 0.2–1.2)
BUN SERPL-MCNC: 19 MG/DL — SIGNIFICANT CHANGE UP (ref 7–23)
CALCIUM SERPL-MCNC: 8.7 MG/DL — SIGNIFICANT CHANGE UP (ref 8.4–10.5)
CHLORIDE SERPL-SCNC: 101 MMOL/L — SIGNIFICANT CHANGE UP (ref 98–107)
CK MB CFR SERPL CALC: 1.5 NG/ML — SIGNIFICANT CHANGE UP
CO2 SERPL-SCNC: 24 MMOL/L — SIGNIFICANT CHANGE UP (ref 22–31)
CREAT SERPL-MCNC: 0.59 MG/DL — SIGNIFICANT CHANGE UP (ref 0.5–1.3)
EGFR: 100 ML/MIN/1.73M2 — SIGNIFICANT CHANGE UP
GLUCOSE BLDC GLUCOMTR-MCNC: 153 MG/DL — HIGH (ref 70–99)
GLUCOSE BLDC GLUCOMTR-MCNC: 163 MG/DL — HIGH (ref 70–99)
GLUCOSE BLDC GLUCOMTR-MCNC: 167 MG/DL — HIGH (ref 70–99)
GLUCOSE BLDC GLUCOMTR-MCNC: 189 MG/DL — HIGH (ref 70–99)
GLUCOSE SERPL-MCNC: 123 MG/DL — HIGH (ref 70–99)
HCT VFR BLD CALC: 39.8 % — SIGNIFICANT CHANGE UP (ref 34.5–45)
HGB BLD-MCNC: 12.1 G/DL — SIGNIFICANT CHANGE UP (ref 11.5–15.5)
MAGNESIUM SERPL-MCNC: 2.1 MG/DL — SIGNIFICANT CHANGE UP (ref 1.6–2.6)
MCHC RBC-ENTMCNC: 25.6 PG — LOW (ref 27–34)
MCHC RBC-ENTMCNC: 30.4 G/DL — LOW (ref 32–36)
MCV RBC AUTO: 84.1 FL — SIGNIFICANT CHANGE UP (ref 80–100)
MRSA PCR RESULT.: SIGNIFICANT CHANGE UP
NRBC # BLD: 0 /100 WBCS — SIGNIFICANT CHANGE UP (ref 0–0)
NRBC # FLD: 0 K/UL — SIGNIFICANT CHANGE UP (ref 0–0)
PHOSPHATE SERPL-MCNC: 2.5 MG/DL — SIGNIFICANT CHANGE UP (ref 2.5–4.5)
PLATELET # BLD AUTO: 150 K/UL — SIGNIFICANT CHANGE UP (ref 150–400)
POTASSIUM SERPL-MCNC: 4.7 MMOL/L — SIGNIFICANT CHANGE UP (ref 3.5–5.3)
POTASSIUM SERPL-SCNC: 4.7 MMOL/L — SIGNIFICANT CHANGE UP (ref 3.5–5.3)
PROT SERPL-MCNC: 7.1 G/DL — SIGNIFICANT CHANGE UP (ref 6–8.3)
RBC # BLD: 4.73 M/UL — SIGNIFICANT CHANGE UP (ref 3.8–5.2)
RBC # FLD: 14.5 % — SIGNIFICANT CHANGE UP (ref 10.3–14.5)
S AUREUS DNA NOSE QL NAA+PROBE: DETECTED
SODIUM SERPL-SCNC: 135 MMOL/L — SIGNIFICANT CHANGE UP (ref 135–145)
TROPONIN T, HIGH SENSITIVITY RESULT: <6 NG/L — SIGNIFICANT CHANGE UP
WBC # BLD: 11.66 K/UL — HIGH (ref 3.8–10.5)
WBC # FLD AUTO: 11.66 K/UL — HIGH (ref 3.8–10.5)

## 2024-12-13 PROCEDURE — 99232 SBSQ HOSP IP/OBS MODERATE 35: CPT | Mod: GC

## 2024-12-13 PROCEDURE — 99231 SBSQ HOSP IP/OBS SF/LOW 25: CPT

## 2024-12-13 PROCEDURE — 93010 ELECTROCARDIOGRAM REPORT: CPT

## 2024-12-13 PROCEDURE — 76705 ECHO EXAM OF ABDOMEN: CPT | Mod: 26

## 2024-12-13 RX ORDER — KETOROLAC TROMETHAMINE 30 MG/ML
15 INJECTION INTRAMUSCULAR; INTRAVENOUS ONCE
Refills: 0 | Status: DISCONTINUED | OUTPATIENT
Start: 2024-12-13 | End: 2024-12-13

## 2024-12-13 RX ORDER — ONDANSETRON HYDROCHLORIDE 4 MG/1
4 TABLET, FILM COATED ORAL
Refills: 0 | Status: DISCONTINUED | OUTPATIENT
Start: 2024-12-13 | End: 2024-12-13

## 2024-12-13 RX ORDER — METOPROLOL TARTRATE 100 MG/1
50 TABLET, FILM COATED ORAL
Refills: 0 | DISCHARGE

## 2024-12-13 RX ORDER — ACETAMINOPHEN 500MG 500 MG/1
1000 TABLET, COATED ORAL ONCE
Refills: 0 | Status: COMPLETED | OUTPATIENT
Start: 2024-12-13 | End: 2024-12-13

## 2024-12-13 RX ORDER — METOPROLOL TARTRATE 100 MG/1
1 TABLET, FILM COATED ORAL
Qty: 0 | Refills: 0 | DISCHARGE
Start: 2024-12-13

## 2024-12-13 RX ADMIN — ACETAMINOPHEN 500MG 400 MILLIGRAM(S): 500 TABLET, COATED ORAL at 12:30

## 2024-12-13 RX ADMIN — KETOROLAC TROMETHAMINE 15 MILLIGRAM(S): 30 INJECTION INTRAMUSCULAR; INTRAVENOUS at 22:03

## 2024-12-13 RX ADMIN — Medication 81 MILLIGRAM(S): at 12:34

## 2024-12-13 RX ADMIN — Medication 112 MICROGRAM(S): at 05:25

## 2024-12-13 RX ADMIN — KETOROLAC TROMETHAMINE 15 MILLIGRAM(S): 30 INJECTION INTRAMUSCULAR; INTRAVENOUS at 21:48

## 2024-12-13 RX ADMIN — PANTOPRAZOLE SODIUM 40 MILLIGRAM(S): 40 TABLET, DELAYED RELEASE ORAL at 05:25

## 2024-12-13 RX ADMIN — INSULIN GLARGINE 6 UNIT(S): 100 INJECTION, SOLUTION SUBCUTANEOUS at 21:50

## 2024-12-13 RX ADMIN — METOCLOPRAMIDE HYDROCHLORIDE 5 MILLIGRAM(S): 10 TABLET ORAL at 22:01

## 2024-12-13 RX ADMIN — METOCLOPRAMIDE HYDROCHLORIDE 5 MILLIGRAM(S): 10 TABLET ORAL at 11:25

## 2024-12-13 RX ADMIN — KETOROLAC TROMETHAMINE 15 MILLIGRAM(S): 30 INJECTION INTRAMUSCULAR; INTRAVENOUS at 19:09

## 2024-12-13 RX ADMIN — METOPROLOL TARTRATE 50 MILLIGRAM(S): 100 TABLET, FILM COATED ORAL at 05:25

## 2024-12-13 RX ADMIN — ROSUVASTATIN CALCIUM 5 MILLIGRAM(S): 5 TABLET, FILM COATED ORAL at 21:50

## 2024-12-13 RX ADMIN — ENOXAPARIN SODIUM 40 MILLIGRAM(S): 30 INJECTION SUBCUTANEOUS at 17:23

## 2024-12-13 RX ADMIN — KETOROLAC TROMETHAMINE 15 MILLIGRAM(S): 30 INJECTION INTRAMUSCULAR; INTRAVENOUS at 18:54

## 2024-12-13 RX ADMIN — ACETAMINOPHEN 500MG 1000 MILLIGRAM(S): 500 TABLET, COATED ORAL at 13:00

## 2024-12-13 RX ADMIN — LISINOPRIL 20 MILLIGRAM(S): 20 TABLET ORAL at 05:25

## 2024-12-13 NOTE — PROGRESS NOTE ADULT - SUBJECTIVE AND OBJECTIVE BOX
DATE OF SERVICE: 12-13-24 @ 07:18    Patient is a 64y old  Female who presents with a chief complaint of Abdominal pain (12 Dec 2024 15:21)      SUBJECTIVE / OVERNIGHT EVENTS:  Overnight,  Pt seen and examined at bedside.    ROS negative except as above.    MEDICATIONS  (STANDING):  aspirin enteric coated 81 milliGRAM(s) Oral daily  dextrose 5%. 1000 milliLiter(s) (100 mL/Hr) IV Continuous <Continuous>  dextrose 5%. 1000 milliLiter(s) (50 mL/Hr) IV Continuous <Continuous>  dextrose 50% Injectable 25 Gram(s) IV Push once  dextrose 50% Injectable 12.5 Gram(s) IV Push once  dextrose 50% Injectable 25 Gram(s) IV Push once  enoxaparin Injectable 40 milliGRAM(s) SubCutaneous every 24 hours  glucagon  Injectable 1 milliGRAM(s) IntraMuscular once  insulin glargine Injectable (LANTUS) 6 Unit(s) SubCutaneous at bedtime  insulin lispro (ADMELOG) corrective regimen sliding scale   SubCutaneous Before meals and at bedtime  levothyroxine 112 MICROGram(s) Oral daily  lisinopril 20 milliGRAM(s) Oral daily  metoprolol succinate ER 50 milliGRAM(s) Oral daily  pantoprazole    Tablet 40 milliGRAM(s) Oral before breakfast  polyethylene glycol 3350 17 Gram(s) Oral daily  rosuvastatin 5 milliGRAM(s) Oral at bedtime  senna 2 Tablet(s) Oral at bedtime    MEDICATIONS  (PRN):  dextrose Oral Gel 15 Gram(s) Oral once PRN Blood Glucose LESS THAN 70 milliGRAM(s)/deciliter  metoclopramide Injectable 5 milliGRAM(s) IV Push every 8 hours PRN n/v      Vital Signs Last 24 Hrs  T(C): 36.4 (13 Dec 2024 05:18), Max: 36.7 (12 Dec 2024 13:16)  T(F): 97.6 (13 Dec 2024 05:18), Max: 98.1 (12 Dec 2024 13:16)  HR: 74 (13 Dec 2024 05:18) (74 - 76)  BP: 134/71 (13 Dec 2024 05:18) (98/62 - 144/93)  BP(mean): --  RR: 18 (13 Dec 2024 05:18) (18 - 18)  SpO2: 99% (13 Dec 2024 05:18) (96% - 100%)    Parameters below as of 13 Dec 2024 05:18  Patient On (Oxygen Delivery Method): room air      CAPILLARY BLOOD GLUCOSE      POCT Blood Glucose.: 141 mg/dL (12 Dec 2024 21:32)  POCT Blood Glucose.: 120 mg/dL (12 Dec 2024 17:35)  POCT Blood Glucose.: 132 mg/dL (12 Dec 2024 14:04)  POCT Blood Glucose.: 113 mg/dL (12 Dec 2024 10:06)    I&O's Summary      PHYSICAL EXAM:  GENERAL: NAD, well-developed  HEAD:  Atraumatic, Normocephalic  EYES: EOMI, conjunctiva and sclera clear  NECK: Supple, No JVD  CHEST/LUNG: Clear to auscultation bilaterally; No wheeze  HEART: Regular rate and rhythm; No murmurs, rubs, or gallops  ABDOMEN: Soft, Nontender, Nondistended; Bowel sounds present  EXTREMITIES:  2+ Peripheral Pulses, No clubbing, cyanosis, or edema  NEUROLOGY: AOx3, non-focal  SKIN: No rashes or lesions    LABS:                        11.9   10.94 )-----------( 262      ( 12 Dec 2024 06:05 )             37.2     12-12    139  |  104  |  21  ----------------------------<  127[H]  3.9   |  20[L]  |  0.60    Ca    8.5      12 Dec 2024 06:05  Phos  2.6     12-12  Mg     2.00     12-12    TPro  7.0  /  Alb  3.4  /  TBili  0.3  /  DBili  x   /  AST  18  /  ALT  13  /  AlkPhos  43  12-12            MICRO:      RADIOLOGY & ADDITIONAL TESTS:           DATE OF SERVICE: 12-13-24 @ 07:18    Patient is a 64y old  Female who presents with a chief complaint of Abdominal pain (12 Dec 2024 15:21)      SUBJECTIVE / OVERNIGHT EVENTS:  Overnight, no acute events.   Pt seen and examined at bedside. Pt feeling better. Reports some abdominal pain, subjective 7/10 improved from yesterday 10/10. However, sitting comfortably in her bed on the phone in no distress. Reports she tolerated liquids and a banana last night. 2 BMs.     ROS negative except as above.    MEDICATIONS  (STANDING):  aspirin enteric coated 81 milliGRAM(s) Oral daily  dextrose 5%. 1000 milliLiter(s) (100 mL/Hr) IV Continuous <Continuous>  dextrose 5%. 1000 milliLiter(s) (50 mL/Hr) IV Continuous <Continuous>  dextrose 50% Injectable 25 Gram(s) IV Push once  dextrose 50% Injectable 12.5 Gram(s) IV Push once  dextrose 50% Injectable 25 Gram(s) IV Push once  enoxaparin Injectable 40 milliGRAM(s) SubCutaneous every 24 hours  glucagon  Injectable 1 milliGRAM(s) IntraMuscular once  insulin glargine Injectable (LANTUS) 6 Unit(s) SubCutaneous at bedtime  insulin lispro (ADMELOG) corrective regimen sliding scale   SubCutaneous Before meals and at bedtime  levothyroxine 112 MICROGram(s) Oral daily  lisinopril 20 milliGRAM(s) Oral daily  metoprolol succinate ER 50 milliGRAM(s) Oral daily  pantoprazole    Tablet 40 milliGRAM(s) Oral before breakfast  polyethylene glycol 3350 17 Gram(s) Oral daily  rosuvastatin 5 milliGRAM(s) Oral at bedtime  senna 2 Tablet(s) Oral at bedtime    MEDICATIONS  (PRN):  dextrose Oral Gel 15 Gram(s) Oral once PRN Blood Glucose LESS THAN 70 milliGRAM(s)/deciliter  metoclopramide Injectable 5 milliGRAM(s) IV Push every 8 hours PRN n/v      Vital Signs Last 24 Hrs  T(C): 36.4 (13 Dec 2024 05:18), Max: 36.7 (12 Dec 2024 13:16)  T(F): 97.6 (13 Dec 2024 05:18), Max: 98.1 (12 Dec 2024 13:16)  HR: 74 (13 Dec 2024 05:18) (74 - 76)  BP: 134/71 (13 Dec 2024 05:18) (98/62 - 144/93)  BP(mean): --  RR: 18 (13 Dec 2024 05:18) (18 - 18)  SpO2: 99% (13 Dec 2024 05:18) (96% - 100%)    Parameters below as of 13 Dec 2024 05:18  Patient On (Oxygen Delivery Method): room air      CAPILLARY BLOOD GLUCOSE      POCT Blood Glucose.: 141 mg/dL (12 Dec 2024 21:32)  POCT Blood Glucose.: 120 mg/dL (12 Dec 2024 17:35)  POCT Blood Glucose.: 132 mg/dL (12 Dec 2024 14:04)  POCT Blood Glucose.: 113 mg/dL (12 Dec 2024 10:06)    I&O's Summary      PHYSICAL EXAM:  GENERAL: NAD, well-developed  HEAD:  Atraumatic, Normocephalic  EYES: EOMI, conjunctiva and sclera clear  NECK: Supple, No JVD  CHEST/LUNG: Clear to auscultation bilaterally; No wheeze  HEART: Regular rate and rhythm; No murmurs, rubs, or gallops  ABDOMEN: Soft, very mild tenderness to palpation RUQ/epigastric, Nondistended; Bowel sounds present  EXTREMITIES:  2+ Peripheral Pulses, No clubbing, cyanosis, or edema  NEUROLOGY: AOx3, non-focal  SKIN: No rashes or lesions    LABS:                        11.9   10.94 )-----------( 262      ( 12 Dec 2024 06:05 )             37.2     12-12    139  |  104  |  21  ----------------------------<  127[H]  3.9   |  20[L]  |  0.60    Ca    8.5      12 Dec 2024 06:05  Phos  2.6     12-12  Mg     2.00     12-12    TPro  7.0  /  Alb  3.4  /  TBili  0.3  /  DBili  x   /  AST  18  /  ALT  13  /  AlkPhos  43  12-12            MICRO:      RADIOLOGY & ADDITIONAL TESTS:

## 2024-12-13 NOTE — PROGRESS NOTE ADULT - ASSESSMENT
64F with NIDDM, GERD, hypertension, hyperlipidemia, presents with RUQ/epigastric abdominal pain associated with nausea/vomiting    Impression  #RUQ/epigastric pain  #nausea/vomiting  Patient presenting with RUQ/epigastric pain associated with nausea/vomiting following dinner (duck) yesterday evening. Hemodynamically stable. Labs with WBC 13k; LFTs normal. RUQUS with Cholelithiasis. Mild distention. Negative sonographic pal.  CBD 8mm. At time of initial consult, Suspect presentation related to gastroenteritis vs. less likely choledocholithiasis given normal liver tests. Pt had MRCP showing diffuse dilation fo CBD without evidence choledocholithiasis or pancreatic head mass. Also showing mild intrahepatic biliary dilatation. Plan was to refer patient for advanced GI work up with EUS outpatient with Dr. Cook however given patient with increased nausea and vomiting and slightly elevated liver enzymes, can tentatively schedule EUS for 12/16/24 if patient is still symptomatic / remains in the hospital.    Recommendations:  - tentative schedule for EUS Monday 12/16 if still admitted to hospital  - make NPO Sunday at midnight  - early AM labs monday AM (4am) pre procedure  - diet as tolerated now  - analgesia / antiemetics per primary team  - rest of care per primary team      Jacqueline Mckay Advanced GI NP  available on [employer] TEAMS (M/T/Th/F 7am-4pm)    NON-URGENT CONSULTS:  Please email [email or web address]    After 5pm, please contact the oncall GI fellow for any urgent issues via the hospital      Total time spent to complete patient's bedside assessment, physical examination, review medical chart including labs & imaging, discuss medical plan of car with housestaff was more than 25 minutes.

## 2024-12-13 NOTE — PROGRESS NOTE ADULT - ASSESSMENT
Vero Soto is a 65 yo F with a PMHx of DM2 on insulin, HTN, HLD, hypothyroidism presenting with acute RUQ pain associated with nausea and vomiting. Physical exam is notable for RUQ tenderness to palpation, and labs are notable for leukocytosis of 13.5, and + glucose and ketones in urine. DDx includes gastritis/gastroenteritis vs. choledocholithiasis

## 2024-12-13 NOTE — DISCHARGE NOTE NURSING/CASE MANAGEMENT/SOCIAL WORK - NSDCFUADDAPPT_GEN_ALL_CORE_FT
APPTS ARE READY TO BE MADE: [X] YES    Best Family or Patient Contact (if needed):    Additional Information about above appointments (if needed):    1: [facility] [place]   2:   3:     Other comments or requests:

## 2024-12-13 NOTE — PROGRESS NOTE ADULT - PROBLEM SELECTOR PLAN 2
Hx of DM2 on insulin (12 U lantus qhs), metformin 1000 mg, Jardiance 10 mg, Glimepiride 4 mg at home.     Plan  - hold oral medications  - begin basal 6 U QHS (NPO)  - SSI q6h while NPO  - FS q6h while NPO Hx of DM2 on insulin (12 U lantus qhs), metformin 1000 mg, Jardiance 10 mg, Glimepiride 4 mg at home.     Plan  - Fingersticks premeal/bedtime  - C/w home lantus 6 units, increase if tolerating food  - SSI

## 2024-12-13 NOTE — PROGRESS NOTE ADULT - PROBLEM SELECTOR PLAN 1
RUQ pain associated with N/V. + leukocytosis of 13.49. US in ED revealed cholelithaisis w/o evidence of acute cholecystitis and dilated common bile duct  Troponin: <6    Plan  - NPO  - GI recs - Obtain MRCP   - GI PCR if has diarrhea  - soft diet after MRCP RUQ pain associated with N/V. + leukocytosis of 13.49. US in ED revealed cholelithaisis w/o evidence of acute cholecystitis and dilated common bile duct  Troponin: <6  MRCP - unequivocal for acute keo, PE much improved, tolerating diet     Plan  - Advance diet as tolerated   - GI, appreciate recs   - O/p EUS for f/u of dilated CBD, per GI   - No need to trend CMP as no abnormalities in liver enzymes/bili/alk phos

## 2024-12-13 NOTE — PROGRESS NOTE ADULT - SUBJECTIVE AND OBJECTIVE BOX
Interval Events: no acute events overnight.     PT seen and examined at bedside. VSS, Afebrile, NAD. Pt c/o nausea vomiting today approx 2 hours after eating followed by abdominal pain. Pain is tender / cramping in nature. PT also states she feels chills but no fevers noted. At time of assessment, pt having EKG done / cardiac labs drawn by RN. PT Denies SOB, CP, dizziness, numbness / tingling in arms/fingers. Abdomen distended / tender in RUQ to palpation / soft.     Pt now with uptrending liver enzymes T bili 0.5, alk phos 135, , ALT 65.     Allergies:  Allergy Status Unknown      Hospital Medications:  aspirin enteric coated 81 milliGRAM(s) Oral daily  dextrose 5%. 1000 milliLiter(s) IV Continuous <Continuous>  dextrose 5%. 1000 milliLiter(s) IV Continuous <Continuous>  dextrose 50% Injectable 25 Gram(s) IV Push once  dextrose 50% Injectable 12.5 Gram(s) IV Push once  dextrose 50% Injectable 25 Gram(s) IV Push once  dextrose Oral Gel 15 Gram(s) Oral once PRN  enoxaparin Injectable 40 milliGRAM(s) SubCutaneous every 24 hours  glucagon  Injectable 1 milliGRAM(s) IntraMuscular once  insulin glargine Injectable (LANTUS) 6 Unit(s) SubCutaneous at bedtime  insulin lispro (ADMELOG) corrective regimen sliding scale   SubCutaneous Before meals and at bedtime  levothyroxine 112 MICROGram(s) Oral daily  lisinopril 20 milliGRAM(s) Oral daily  metoclopramide Injectable 5 milliGRAM(s) IV Push every 8 hours PRN  metoprolol succinate ER 50 milliGRAM(s) Oral daily  ondansetron Injectable 4 milliGRAM(s) IV Push <User Schedule>  pantoprazole    Tablet 40 milliGRAM(s) Oral before breakfast  polyethylene glycol 3350 17 Gram(s) Oral daily  rosuvastatin 5 milliGRAM(s) Oral at bedtime  senna 2 Tablet(s) Oral at bedtime      ROS: As per HPI, otherwise 10-point ROS reviewed and negative.    Vital Signs:  Vital Signs Last 24 Hrs  T(C): 36.9 (13 Dec 2024 12:30), Max: 36.9 (13 Dec 2024 12:30)  T(F): 98.5 (13 Dec 2024 12:30), Max: 98.5 (13 Dec 2024 12:30)  HR: 77 (13 Dec 2024 12:30) (74 - 77)  BP: 149/86 (13 Dec 2024 12:30) (98/62 - 149/86)  BP(mean): --  RR: 18 (13 Dec 2024 12:30) (18 - 18)  SpO2: 98% (13 Dec 2024 12:30) (96% - 99%)    Parameters below as of 13 Dec 2024 12:30  Patient On (Oxygen Delivery Method): room air      Daily     Daily         LABS:                        12.1   11.66 )-----------( 150      ( 13 Dec 2024 07:39 )             39.8     Mean Cell Volume: 84.1 fL (12-13-24 @ 07:39)    12-13    135  |  101  |  19  ----------------------------<  123[H]  4.7   |  24  |  0.59    Ca    8.7      13 Dec 2024 07:39  Phos  2.5     12-13  Mg     2.10     12-13    TPro  7.1  /  Alb  3.6  /  TBili  0.6  /  DBili  0.5[H]  /  AST  144[H]  /  ALT  65[H]  /  AlkPhos  135[H]  12-13    LIVER FUNCTIONS - ( 13 Dec 2024 12:36 )  Alb: 3.6 g/dL / Pro: 7.1 g/dL / ALK PHOS: 135 U/L / ALT: 65 U/L / AST: 144 U/L / GGT: x             Urinalysis Basic - ( 13 Dec 2024 07:39 )    Color: x / Appearance: x / SG: x / pH: x  Gluc: 123 mg/dL / Ketone: x  / Bili: x / Urobili: x   Blood: x / Protein: x / Nitrite: x   Leuk Esterase: x / RBC: x / WBC x   Sq Epi: x / Non Sq Epi: x / Bacteria: x                              12.1   11.66 )-----------( 150      ( 13 Dec 2024 07:39 )             39.8                         11.9   10.94 )-----------( 262      ( 12 Dec 2024 06:05 )             37.2                         13.4   13.49 )-----------( 297      ( 11 Dec 2024 04:15 )             42.7       PHYSICAL EXAM  GENERAL:  Lying in bed in NAD  HEENT:  NCAT, no scleral icterus  NECK: Trachea midline  CHEST:  Resp even, non labored  ABDOMEN:  Soft, tender, distended  EXTREMITIES:  WWP, no edema  SKIN:  No visible rash or jaundice  NEURO:  Alert and oriented x 3    Imaging:  < from: MR MRCP w/wo IV Cont (12.12.24 @ 11:43) >    FINDINGS:  LOWER CHEST:Within normal limits.    LIVER: Arterial enhancement and diffusion restriction within the liver   adjacent to the gallbladder, which may be indicative of an inflamed   gallbladder.  BILE DUCTS: Mild intrahepatic biliary dilatation. Diffuse dilatationof   the common bile duct to the level of the ampulla of Vater, measuring up   to 0.9 cm. No choledocholithiasis.  GALLBLADDER: Layering sludge and tiny stones. Mild distention. No wall   thickening. No pericholecystic fluid.  SPLEEN: Within normal limits.  PANCREAS: Within normal limits.  ADRENALS: Within normal limits.  KIDNEYS/URETERS: Within normal limits.    VISUALIZED PORTIONS:  BOWEL: Colonic diverticulosis.  PERITONEUM: No ascites.  VESSELS: Within normal limits.  RETROPERITONEUM/LYMPH NODES: No lymphadenopathy.  ABDOMINAL WALL: Moderate fat-containing umbilical hernia.  BONES: Degenerative changes.    IMPRESSION:  Diffuse dilatation of the common bile duct to the level of the ampulla of   Vater of uncertain etiology. No choledocholithiasis or pancreatic head   mass. Mild intrahepatic biliary dilatation.    Findings equivocal for acute cholecystitis. Correlate clinically and   consider HIDA scan for further evaluation.    --- End of Report ---          < end of copied text >

## 2024-12-13 NOTE — DISCHARGE NOTE NURSING/CASE MANAGEMENT/SOCIAL WORK - NSDCVIVACCINE_GEN_ALL_CORE_FT
Tdap; 14-Mar-2014 00:20; Yvrose Hagan (RN); [id number]; IntraMuscular; Deltoid Left.; 0.5 milliLiter(s);

## 2024-12-13 NOTE — DISCHARGE NOTE NURSING/CASE MANAGEMENT/SOCIAL WORK - FINANCIAL ASSISTANCE
[facility] provides services at a reduced cost to those who are determined to be eligible through [facility]’s financial assistance program. Information regarding [facility]’s financial assistance program can be found by going to [email or web address] or by calling [phone].

## 2024-12-13 NOTE — DISCHARGE NOTE NURSING/CASE MANAGEMENT/SOCIAL WORK - PATIENT PORTAL LINK FT
You can access the FollowMyHealth Patient Portal offered by [facility] by registering at the following website: [email or web address]. By joining [employer]’s FollowMyHealth portal, you will also be able to view your health information using other applications (apps) compatible with our system.

## 2024-12-14 LAB
ALBUMIN SERPL ELPH-MCNC: 3.4 G/DL — SIGNIFICANT CHANGE UP (ref 3.3–5)
ALP SERPL-CCNC: 232 U/L — HIGH (ref 40–120)
ALT FLD-CCNC: 697 U/L — HIGH (ref 4–33)
ANION GAP SERPL CALC-SCNC: 15 MMOL/L — HIGH (ref 7–14)
APPEARANCE UR: CLEAR — SIGNIFICANT CHANGE UP
APTT BLD: 30.5 SEC — SIGNIFICANT CHANGE UP (ref 24.5–35.6)
AST SERPL-CCNC: 698 U/L — HIGH (ref 4–32)
BACTERIA # UR AUTO: NEGATIVE /HPF — SIGNIFICANT CHANGE UP
BASOPHILS # BLD AUTO: 0.02 K/UL — SIGNIFICANT CHANGE UP (ref 0–0.2)
BASOPHILS NFR BLD AUTO: 0.2 % — SIGNIFICANT CHANGE UP (ref 0–2)
BILIRUB DIRECT SERPL-MCNC: 2.9 MG/DL — HIGH (ref 0–0.3)
BILIRUB INDIRECT FLD-MCNC: 0.3 MG/DL — SIGNIFICANT CHANGE UP (ref 0–1)
BILIRUB SERPL-MCNC: 3.2 MG/DL — HIGH (ref 0.2–1.2)
BILIRUB UR-MCNC: ABNORMAL
BLD GP AB SCN SERPL QL: NEGATIVE — SIGNIFICANT CHANGE UP
BUN SERPL-MCNC: 13 MG/DL — SIGNIFICANT CHANGE UP (ref 7–23)
CALCIUM SERPL-MCNC: 8.4 MG/DL — SIGNIFICANT CHANGE UP (ref 8.4–10.5)
CAST: 1 /LPF — SIGNIFICANT CHANGE UP (ref 0–4)
CHLORIDE SERPL-SCNC: 99 MMOL/L — SIGNIFICANT CHANGE UP (ref 98–107)
CO2 SERPL-SCNC: 20 MMOL/L — LOW (ref 22–31)
COLOR SPEC: SIGNIFICANT CHANGE UP
CREAT SERPL-MCNC: 0.56 MG/DL — SIGNIFICANT CHANGE UP (ref 0.5–1.3)
DIFF PNL FLD: NEGATIVE — SIGNIFICANT CHANGE UP
EGFR: 101 ML/MIN/1.73M2 — SIGNIFICANT CHANGE UP
EOSINOPHIL # BLD AUTO: 0.01 K/UL — SIGNIFICANT CHANGE UP (ref 0–0.5)
EOSINOPHIL NFR BLD AUTO: 0.1 % — SIGNIFICANT CHANGE UP (ref 0–6)
GLUCOSE BLDC GLUCOMTR-MCNC: 167 MG/DL — HIGH (ref 70–99)
GLUCOSE BLDC GLUCOMTR-MCNC: 190 MG/DL — HIGH (ref 70–99)
GLUCOSE BLDC GLUCOMTR-MCNC: 202 MG/DL — HIGH (ref 70–99)
GLUCOSE BLDC GLUCOMTR-MCNC: 236 MG/DL — HIGH (ref 70–99)
GLUCOSE SERPL-MCNC: 191 MG/DL — HIGH (ref 70–99)
GLUCOSE UR QL: >=1000 MG/DL
HCT VFR BLD CALC: 38.5 % — SIGNIFICANT CHANGE UP (ref 34.5–45)
HGB BLD-MCNC: 12.6 G/DL — SIGNIFICANT CHANGE UP (ref 11.5–15.5)
IANC: 11.27 K/UL — HIGH (ref 1.8–7.4)
IMM GRANULOCYTES NFR BLD AUTO: 0.5 % — SIGNIFICANT CHANGE UP (ref 0–0.9)
INR BLD: 1.25 RATIO — HIGH (ref 0.85–1.16)
KETONES UR-MCNC: NEGATIVE MG/DL — SIGNIFICANT CHANGE UP
LEUKOCYTE ESTERASE UR-ACNC: ABNORMAL
LYMPHOCYTES # BLD AUTO: 1.02 K/UL — SIGNIFICANT CHANGE UP (ref 1–3.3)
LYMPHOCYTES # BLD AUTO: 7.7 % — LOW (ref 13–44)
MAGNESIUM SERPL-MCNC: 1.8 MG/DL — SIGNIFICANT CHANGE UP (ref 1.6–2.6)
MCHC RBC-ENTMCNC: 26.5 PG — LOW (ref 27–34)
MCHC RBC-ENTMCNC: 32.7 G/DL — SIGNIFICANT CHANGE UP (ref 32–36)
MCV RBC AUTO: 80.9 FL — SIGNIFICANT CHANGE UP (ref 80–100)
MONOCYTES # BLD AUTO: 0.93 K/UL — HIGH (ref 0–0.9)
MONOCYTES NFR BLD AUTO: 7 % — SIGNIFICANT CHANGE UP (ref 2–14)
NEUTROPHILS # BLD AUTO: 11.27 K/UL — HIGH (ref 1.8–7.4)
NEUTROPHILS NFR BLD AUTO: 84.5 % — HIGH (ref 43–77)
NITRITE UR-MCNC: NEGATIVE — SIGNIFICANT CHANGE UP
NRBC # BLD: 0 /100 WBCS — SIGNIFICANT CHANGE UP (ref 0–0)
NRBC # FLD: 0 K/UL — SIGNIFICANT CHANGE UP (ref 0–0)
PH UR: 6 — SIGNIFICANT CHANGE UP (ref 5–8)
PHOSPHATE SERPL-MCNC: 1.6 MG/DL — LOW (ref 2.5–4.5)
PLATELET # BLD AUTO: 261 K/UL — SIGNIFICANT CHANGE UP (ref 150–400)
POTASSIUM SERPL-MCNC: 3.8 MMOL/L — SIGNIFICANT CHANGE UP (ref 3.5–5.3)
POTASSIUM SERPL-SCNC: 3.8 MMOL/L — SIGNIFICANT CHANGE UP (ref 3.5–5.3)
PROT SERPL-MCNC: 7.1 G/DL — SIGNIFICANT CHANGE UP (ref 6–8.3)
PROT UR-MCNC: 100 MG/DL
PROTHROM AB SERPL-ACNC: 14.9 SEC — HIGH (ref 9.9–13.4)
RBC # BLD: 4.76 M/UL — SIGNIFICANT CHANGE UP (ref 3.8–5.2)
RBC # FLD: 14 % — SIGNIFICANT CHANGE UP (ref 10.3–14.5)
RBC CASTS # UR COMP ASSIST: 3 /HPF — SIGNIFICANT CHANGE UP (ref 0–4)
RH IG SCN BLD-IMP: POSITIVE — SIGNIFICANT CHANGE UP
SODIUM SERPL-SCNC: 134 MMOL/L — LOW (ref 135–145)
SP GR SPEC: 1.02 — SIGNIFICANT CHANGE UP (ref 1–1.03)
SQUAMOUS # UR AUTO: 2 /HPF — SIGNIFICANT CHANGE UP (ref 0–5)
UROBILINOGEN FLD QL: 1 MG/DL — SIGNIFICANT CHANGE UP (ref 0.2–1)
WBC # BLD: 13.32 K/UL — HIGH (ref 3.8–10.5)
WBC # FLD AUTO: 13.32 K/UL — HIGH (ref 3.8–10.5)
WBC UR QL: 4 /HPF — SIGNIFICANT CHANGE UP (ref 0–5)

## 2024-12-14 PROCEDURE — 71045 X-RAY EXAM CHEST 1 VIEW: CPT | Mod: 26

## 2024-12-14 PROCEDURE — 99233 SBSQ HOSP IP/OBS HIGH 50: CPT | Mod: GC

## 2024-12-14 PROCEDURE — 99222 1ST HOSP IP/OBS MODERATE 55: CPT | Mod: GC

## 2024-12-14 RX ORDER — ACETAMINOPHEN 500MG 500 MG/1
650 TABLET, COATED ORAL ONCE
Refills: 0 | Status: COMPLETED | OUTPATIENT
Start: 2024-12-14 | End: 2024-12-14

## 2024-12-14 RX ORDER — OXYCODONE HYDROCHLORIDE 30 MG/1
2.5 TABLET ORAL ONCE
Refills: 0 | Status: DISCONTINUED | OUTPATIENT
Start: 2024-12-14 | End: 2024-12-14

## 2024-12-14 RX ORDER — PIPERACILLIN SODIUM AND TAZOBACTAM SODIUM 4; .5 G/20ML; G/20ML
3.38 INJECTION, POWDER, LYOPHILIZED, FOR SOLUTION INTRAVENOUS ONCE
Refills: 0 | Status: COMPLETED | OUTPATIENT
Start: 2024-12-14 | End: 2024-12-14

## 2024-12-14 RX ORDER — HEPARIN SODIUM 5000 [USP'U]/.5ML
30 INJECTION, SOLUTION INTRAVENOUS; SUBCUTANEOUS ONCE
Refills: 0 | Status: COMPLETED | OUTPATIENT
Start: 2024-12-14 | End: 2024-12-14

## 2024-12-14 RX ORDER — SODIUM CHLORIDE 9 MG/ML
1000 INJECTION, SOLUTION INTRAMUSCULAR; INTRAVENOUS; SUBCUTANEOUS
Refills: 0 | Status: DISCONTINUED | OUTPATIENT
Start: 2024-12-14 | End: 2024-12-18

## 2024-12-14 RX ORDER — PIPERACILLIN SODIUM AND TAZOBACTAM SODIUM 4; .5 G/20ML; G/20ML
3.38 INJECTION, POWDER, LYOPHILIZED, FOR SOLUTION INTRAVENOUS EVERY 8 HOURS
Refills: 0 | Status: DISCONTINUED | OUTPATIENT
Start: 2024-12-14 | End: 2024-12-17

## 2024-12-14 RX ORDER — POTASSIUM CHLORIDE 600 MG/1
20 TABLET, EXTENDED RELEASE ORAL ONCE
Refills: 0 | Status: COMPLETED | OUTPATIENT
Start: 2024-12-14 | End: 2024-12-14

## 2024-12-14 RX ADMIN — LISINOPRIL 20 MILLIGRAM(S): 20 TABLET ORAL at 05:58

## 2024-12-14 RX ADMIN — OXYCODONE HYDROCHLORIDE 2.5 MILLIGRAM(S): 30 TABLET ORAL at 01:00

## 2024-12-14 RX ADMIN — Medication 81 MILLIGRAM(S): at 11:27

## 2024-12-14 RX ADMIN — SODIUM CHLORIDE 60 MILLILITER(S): 9 INJECTION, SOLUTION INTRAMUSCULAR; INTRAVENOUS; SUBCUTANEOUS at 13:55

## 2024-12-14 RX ADMIN — PIPERACILLIN SODIUM AND TAZOBACTAM SODIUM 200 GRAM(S): 4; .5 INJECTION, POWDER, LYOPHILIZED, FOR SOLUTION INTRAVENOUS at 11:27

## 2024-12-14 RX ADMIN — HEPARIN SODIUM 85 MILLIMOLE(S): 5000 INJECTION, SOLUTION INTRAVENOUS; SUBCUTANEOUS at 09:51

## 2024-12-14 RX ADMIN — ACETAMINOPHEN 500MG 650 MILLIGRAM(S): 500 TABLET, COATED ORAL at 06:44

## 2024-12-14 RX ADMIN — OXYCODONE HYDROCHLORIDE 2.5 MILLIGRAM(S): 30 TABLET ORAL at 02:00

## 2024-12-14 RX ADMIN — PANTOPRAZOLE SODIUM 40 MILLIGRAM(S): 40 TABLET, DELAYED RELEASE ORAL at 05:58

## 2024-12-14 RX ADMIN — INSULIN GLARGINE 6 UNIT(S): 100 INJECTION, SOLUTION SUBCUTANEOUS at 21:33

## 2024-12-14 RX ADMIN — ROSUVASTATIN CALCIUM 5 MILLIGRAM(S): 5 TABLET, FILM COATED ORAL at 21:32

## 2024-12-14 RX ADMIN — POTASSIUM CHLORIDE 50 MILLIEQUIVALENT(S): 600 TABLET, EXTENDED RELEASE ORAL at 16:26

## 2024-12-14 RX ADMIN — ENOXAPARIN SODIUM 40 MILLIGRAM(S): 30 INJECTION SUBCUTANEOUS at 16:49

## 2024-12-14 RX ADMIN — PIPERACILLIN SODIUM AND TAZOBACTAM SODIUM 25 GRAM(S): 4; .5 INJECTION, POWDER, LYOPHILIZED, FOR SOLUTION INTRAVENOUS at 14:42

## 2024-12-14 RX ADMIN — Medication 112 MICROGRAM(S): at 05:58

## 2024-12-14 RX ADMIN — PIPERACILLIN SODIUM AND TAZOBACTAM SODIUM 25 GRAM(S): 4; .5 INJECTION, POWDER, LYOPHILIZED, FOR SOLUTION INTRAVENOUS at 21:32

## 2024-12-14 RX ADMIN — METOPROLOL TARTRATE 50 MILLIGRAM(S): 100 TABLET, FILM COATED ORAL at 05:58

## 2024-12-14 RX ADMIN — ACETAMINOPHEN 500MG 650 MILLIGRAM(S): 500 TABLET, COATED ORAL at 08:00

## 2024-12-14 NOTE — PROGRESS NOTE ADULT - PROBLEM SELECTOR PLAN 2
Hx of DM2 on insulin (12 U lantus qhs), metformin 1000 mg, Jardiance 10 mg, Glimepiride 4 mg at home.     Plan  - Fingersticks premeal/bedtime  - C/w home lantus 6 units, increase if tolerating food  - SSI

## 2024-12-14 NOTE — PROGRESS NOTE ADULT - SUBJECTIVE AND OBJECTIVE BOX
***************************************************************  Mynor Bains, PGY2  Internal Medicine   Preferred contact via Microsoft Teams   ***************************************************************    JIGAR WATERS  64y  MRN: [id number]    Patient is a 64y old  Female who presents with a chief complaint of Abdominal pain (13 Dec 2024 14:07)      Interval/Overnight Events: no events ON.     Subjective: Pt seen and examined at bedside. Denies fever, CP, SOB, abn pain, N/V, dysuria. Tolerating diet.      MEDICATIONS  (STANDING):  aspirin enteric coated 81 milliGRAM(s) Oral daily  dextrose 5%. 1000 milliLiter(s) (100 mL/Hr) IV Continuous <Continuous>  dextrose 5%. 1000 milliLiter(s) (50 mL/Hr) IV Continuous <Continuous>  dextrose 50% Injectable 25 Gram(s) IV Push once  dextrose 50% Injectable 12.5 Gram(s) IV Push once  dextrose 50% Injectable 25 Gram(s) IV Push once  enoxaparin Injectable 40 milliGRAM(s) SubCutaneous every 24 hours  glucagon  Injectable 1 milliGRAM(s) IntraMuscular once  insulin glargine Injectable (LANTUS) 6 Unit(s) SubCutaneous at bedtime  insulin lispro (ADMELOG) corrective regimen sliding scale   SubCutaneous Before meals and at bedtime  levothyroxine 112 MICROGram(s) Oral daily  lisinopril 20 milliGRAM(s) Oral daily  metoprolol succinate ER 50 milliGRAM(s) Oral daily  pantoprazole    Tablet 40 milliGRAM(s) Oral before breakfast  polyethylene glycol 3350 17 Gram(s) Oral daily  rosuvastatin 5 milliGRAM(s) Oral at bedtime  senna 2 Tablet(s) Oral at bedtime    MEDICATIONS  (PRN):  dextrose Oral Gel 15 Gram(s) Oral once PRN Blood Glucose LESS THAN 70 milliGRAM(s)/deciliter  metoclopramide Injectable 5 milliGRAM(s) IV Push every 8 hours PRN n/v      Objective:    Vitals: Vital Signs Last 24 Hrs  T(C): 38.6 (12-14-24 @ 05:05), Max: 38.6 (12-14-24 @ 05:05)  T(F): 101.4 (12-14-24 @ 05:05), Max: 101.4 (12-14-24 @ 05:05)  HR: 102 (12-14-24 @ 05:05) (73 - 102)  BP: 128/66 (12-14-24 @ 05:05) (128/66 - 149/86)  BP(mean): --  RR: 18 (12-14-24 @ 05:05) (18 - 18)  SpO2: 95% (12-14-24 @ 05:05) (95% - 100%)                I&O's Summary      PHYSICAL EXAM:  GENERAL: NAD  HEAD:  Atraumatic, Normocephalic  EYES: EOMI, conjunctiva and sclera clear  CHEST/LUNG: Clear to auscultation bilaterally; No rales, rhonchi, wheezing, or rubs  HEART: Regular rate and rhythm; No murmurs, rubs, or gallops  ABDOMEN: Soft, Nontender, Nondistended;   SKIN: No rashes or lesions  NERVOUS SYSTEM:  Alert & Oriented X3, no focal deficits    LABS:                        12.1   11.66 )-----------( 150      ( 13 Dec 2024 07:39 )             39.8                         11.9   10.94 )-----------( 262      ( 12 Dec 2024 06:05 )             37.2     12-13    135  |  101  |  19  ----------------------------<  123[H]  4.7   |  24  |  0.59  12-12    139  |  104  |  21  ----------------------------<  127[H]  3.9   |  20[L]  |  0.60    Ca    8.7      13 Dec 2024 07:39  Ca    8.5      12 Dec 2024 06:05  Phos  2.5     12-13  Mg     2.10     12-13    TPro  7.1  /  Alb  3.6  /  TBili  0.6  /  DBili  0.5[H]  /  AST  144[H]  /  ALT  65[H]  /  AlkPhos  135[H]  12-13  TPro  x   /  Alb  x   /  TBili  0.3  /  DBili  <0.2  /  AST  21  /  ALT  10  /  AlkPhos  48  12-13  TPro  7.0  /  Alb  3.4  /  TBili  0.3  /  DBili  x   /  AST  18  /  ALT  13  /  AlkPhos  43  12-12    CAPILLARY BLOOD GLUCOSE      POCT Blood Glucose.: 189 mg/dL (13 Dec 2024 21:42)  POCT Blood Glucose.: 163 mg/dL (13 Dec 2024 17:40)  POCT Blood Glucose.: 167 mg/dL (13 Dec 2024 12:23)  POCT Blood Glucose.: 153 mg/dL (13 Dec 2024 08:37)        Urinalysis Basic - ( 13 Dec 2024 07:39 )    Color: x / Appearance: x / SG: x / pH: x  Gluc: 123 mg/dL / Ketone: x  / Bili: x / Urobili: x   Blood: x / Protein: x / Nitrite: x   Leuk Esterase: x / RBC: x / WBC x   Sq Epi: x / Non Sq Epi: x / Bacteria: x          RADIOLOGY & ADDITIONAL TESTS:    Imaging Personally Reviewed:  [x ] YES  [ ] NO    Consultants involved in case:   Consultant(s) Notes Reviewed:  [ x] YES  [ ] NO:   Care Discussed with Consultants/Other Providers [x ] YES  [ ] NO         ***************************************************************  Mynor Bains, PGY2  Internal Medicine   Preferred contact via [employer] Teams   ***************************************************************    JIGAR WATERS  64y  MRN: [id number]    Patient is a 64y old  Female who presents with a chief complaint of Abdominal pain (13 Dec 2024 14:07)      Interval/Overnight Events: Pt febrile overnight     Subjective: Pt still reporting 6/10 abdominal pain    MEDICATIONS  (STANDING):  aspirin enteric coated 81 milliGRAM(s) Oral daily  dextrose 5%. 1000 milliLiter(s) (100 mL/Hr) IV Continuous <Continuous>  dextrose 5%. 1000 milliLiter(s) (50 mL/Hr) IV Continuous <Continuous>  dextrose 50% Injectable 25 Gram(s) IV Push once  dextrose 50% Injectable 12.5 Gram(s) IV Push once  dextrose 50% Injectable 25 Gram(s) IV Push once  enoxaparin Injectable 40 milliGRAM(s) SubCutaneous every 24 hours  glucagon  Injectable 1 milliGRAM(s) IntraMuscular once  insulin glargine Injectable (LANTUS) 6 Unit(s) SubCutaneous at bedtime  insulin lispro (ADMELOG) corrective regimen sliding scale   SubCutaneous Before meals and at bedtime  levothyroxine 112 MICROGram(s) Oral daily  lisinopril 20 milliGRAM(s) Oral daily  metoprolol succinate ER 50 milliGRAM(s) Oral daily  pantoprazole    Tablet 40 milliGRAM(s) Oral before breakfast  polyethylene glycol 3350 17 Gram(s) Oral daily  rosuvastatin 5 milliGRAM(s) Oral at bedtime  senna 2 Tablet(s) Oral at bedtime    MEDICATIONS  (PRN):  dextrose Oral Gel 15 Gram(s) Oral once PRN Blood Glucose LESS THAN 70 milliGRAM(s)/deciliter  metoclopramide Injectable 5 milliGRAM(s) IV Push every 8 hours PRN n/v      Objective:    Vitals: Vital Signs Last 24 Hrs  T(C): 38.6 (12-14-24 @ 05:05), Max: 38.6 (12-14-24 @ 05:05)  T(F): 101.4 (12-14-24 @ 05:05), Max: 101.4 (12-14-24 @ 05:05)  HR: 102 (12-14-24 @ 05:05) (73 - 102)  BP: 128/66 (12-14-24 @ 05:05) (128/66 - 149/86)  BP(mean): --  RR: 18 (12-14-24 @ 05:05) (18 - 18)  SpO2: 95% (12-14-24 @ 05:05) (95% - 100%)                I&O's Summary      PHYSICAL EXAM:  GENERAL: NAD  HEAD:  Atraumatic, Normocephalic  EYES: EOMI, conjunctiva and sclera clear  CHEST/LUNG: Clear to auscultation bilaterally; No rales, rhonchi, wheezing, or rubs  HEART: Regular rate and rhythm; No murmurs, rubs, or gallops  ABDOMEN: RUQ pain   SKIN: No rashes or lesions  NERVOUS SYSTEM:  Alert & Oriented X3, no focal deficits    LABS:                        12.1   11.66 )-----------( 150      ( 13 Dec 2024 07:39 )             39.8                         11.9   10.94 )-----------( 262      ( 12 Dec 2024 06:05 )             37.2     12-13    135  |  101  |  19  ----------------------------<  123[H]  4.7   |  24  |  0.59  12-12    139  |  104  |  21  ----------------------------<  127[H]  3.9   |  20[L]  |  0.60    Ca    8.7      13 Dec 2024 07:39  Ca    8.5      12 Dec 2024 06:05  Phos  2.5     12-13  Mg     2.10     12-13    TPro  7.1  /  Alb  3.6  /  TBili  0.6  /  DBili  0.5[H]  /  AST  144[H]  /  ALT  65[H]  /  AlkPhos  135[H]  12-13  TPro  x   /  Alb  x   /  TBili  0.3  /  DBili  <0.2  /  AST  21  /  ALT  10  /  AlkPhos  48  12-13  TPro  7.0  /  Alb  3.4  /  TBili  0.3  /  DBili  x   /  AST  18  /  ALT  13  /  AlkPhos  43  12-12    CAPILLARY BLOOD GLUCOSE      POCT Blood Glucose.: 189 mg/dL (13 Dec 2024 21:42)  POCT Blood Glucose.: 163 mg/dL (13 Dec 2024 17:40)  POCT Blood Glucose.: 167 mg/dL (13 Dec 2024 12:23)  POCT Blood Glucose.: 153 mg/dL (13 Dec 2024 08:37)        Urinalysis Basic - ( 13 Dec 2024 07:39 )    Color: x / Appearance: x / SG: x / pH: x  Gluc: 123 mg/dL / Ketone: x  / Bili: x / Urobili: x   Blood: x / Protein: x / Nitrite: x   Leuk Esterase: x / RBC: x / WBC x   Sq Epi: x / Non Sq Epi: x / Bacteria: x          RADIOLOGY & ADDITIONAL TESTS:    Imaging Personally Reviewed:  [x ] YES  [ ] NO    Consultants involved in case:   Consultant(s) Notes Reviewed:  [ x] YES  [ ] NO:   Care Discussed with Consultants/Other Providers [x ] YES  [ ] NO

## 2024-12-14 NOTE — PROGRESS NOTE ADULT - ASSESSMENT
64F with NIDDM, GERD, hypertension, hyperlipidemia, presents with RUQ/epigastric abdominal pain associated with nausea/vomiting.     #Acute febrile illness  #Rising T Bili / liver enzymes  #RUQ/epigastric pain  #nausea/vomiting  Patient presenting with RUQ/epigastric pain associated with nausea/vomiting following dinner (duck). Hemodynamically stable. Labs with initial WBC 13k; LFTs normal. RUQ US with Cholelithiasis, mild distention, negative sonographic pal.  CBD 8mm. At time of initial consult, Suspect presentation related to gastroenteritis vs. less likely choledocholithiasis given normal liver tests. Pt had MRCP showing diffuse dilation fo CBD without evidence choledocholithiasis or pancreatic head mass. Also showing mild intrahepatic biliary dilatation. As of 12/14, patient developed fevers overnight to 101.4F associated with rising T Bili to 3.2 (D Bili 2.9) and elevation in liver enzymes (, , ). Presentation is concerning for possible cholangitis however etiology remains unclear as no stones or lesions were visualized on MRI.     Recommendations:  - Plan for EUS +/- ERCP Monday, or earlier if clinically indicated   - Empiric Zosyn  - Complete infectious work-up including blood cultures, UA/UCx & CXR   - Closely monitor vital signs, clinical symptoms and fever curve  - IVF hydration   - Clear liquid diet  - analgesia and antiemetics as needed. Avoid NSAIDS   - Trend CBC, CMP daily  - Please notify GI team via paging system for any changes in clinical status that may warrant earlier endoscopic intervention (i.e. hypotension, AMS)    Patrick Pena MD  Gastroenterology/Hepatology Fellow  Available via [employer] Teams  Pager: [phone]    On Weekdays after 5 PM to 8AM and Weekends/Holidays (All day)  For non-urgent consults, please email [email or web address] or [email or web address]  For urgent consults, please contact on call GI team.  See Amion schedule ([facility]), [employer] system - [phone] ([facility]), or call hospital  ([facility])   64F with NIDDM, GERD, hypertension, hyperlipidemia, presents with RUQ/epigastric abdominal pain associated with nausea/vomiting.     #Acute febrile illness  #Rising T Bili / liver enzymes  #RUQ/epigastric pain  #nausea/vomiting  Patient presenting with RUQ/epigastric pain associated with nausea/vomiting following dinner (duck). Hemodynamically stable. Labs with initial WBC 13k; LFTs normal. RUQ US with Cholelithiasis, mild distention, negative sonographic pal.  CBD 8mm. At time of initial consult, Suspect presentation related to gastroenteritis vs. less likely choledocholithiasis given normal liver tests. Pt had MRCP showing diffuse dilation fo CBD without evidence choledocholithiasis or pancreatic head mass. Also showing mild intrahepatic biliary dilatation. As of 12/14, patient developed fevers overnight to 101.4F associated with rising T Bili to 3.2 (D Bili 2.9) and elevation in liver enzymes (, , ). Presentation is concerning for cholangitis ([place] grade 1, mild) however etiology remains unclear as no stones or lesions were visualized on MRI.     Recommendations:  - Plan for EUS +/- ERCP Monday, or earlier if clinically indicated   - Empiric Zosyn  - Complete infectious work-up including blood cultures, UA/UCx & CXR   - Closely monitor vital signs, clinical symptoms and fever curve  - Repeat PT/INR  - IVF hydration   - Clear liquid diet  - analgesia and antiemetics as needed. Avoid NSAIDS   - Trend CBC, CMP daily  - Please notify GI team via paging system for any changes in clinical status that may warrant earlier endoscopic intervention (i.e. hypotension, AMS)    Patrick Pena MD  Gastroenterology/Hepatology Fellow  Available via [employer] Teams  Pager: [phone]    On Weekdays after 5 PM to 8AM and Weekends/Holidays (All day)  For non-urgent consults, please email [email or web address] or [email or web address]  For urgent consults, please contact on call GI team.  See Steph sorto ([facility]), [employer] system - [phone] ([facility]), or call hospital  ([facility])

## 2024-12-14 NOTE — CONSULT NOTE ADULT - ATTENDING COMMENTS
Patient with acute cholangitis and radiographic evidence of choledocholithiasis.  plan  iv abx  gi for ercp  plan for same admission cholecystectomy    I have personally interviewed and examined this patient, reviewed pertinent labs and imaging, and discussed the case with colleagues, residents, and physician assistants on B Team rounds.    The active care issues are:  1. acute cholangitis    [facility] (B Team) Attending Group [facility]    urgent issues - spectra [id number]  nonurgent issues - [phone]  patient appointments or afterhours - [phone]
Agree with above.    I saw and examined the patient on 12-11-24. I agree with the findings and plan of care as documented in the fellow/resident/medical student/physician assistant/nurse practitioner.    Today we are treating the patient for:   Abdominal pain  nausea and vomiting  dilated CBD on U/S    ***General note with plan / recommendation:   Agree with above. Patient with post parandial abdominal pain, nausea, vomting. Given the normal LFT it is less likely patient has biliary obstruction or stone.  Obtain MRI/MRCP with IV contrast to rule out choledocholithiasis.      Billing:  I have reviewed records from labs, U/S    Other:  - Thank you for involving us in the care of this patient. If you have any questions regarding the plan of care please do not hesitate to call us back.    Contact:     Daytime        Available on [employer] Teams        [phone] ([facility] Short Range Pager)        [phone] ([facility] Long Range Pager)     On Weekends/Holidays (All day) and Weekdays after 5 PM to 8AM:        For non-urgent consults, please email [email or web address] or [email or web address]        For emergent consults, please contact on call GI team.  See Amion schedule ([facility]), [email or web address] paging system ([facility]), or call hospital  ([facility])

## 2024-12-14 NOTE — CONSULT NOTE ADULT - SUBJECTIVE AND OBJECTIVE BOX
*** SURGERY CONSULT NOTE    Consulting Team: [facility] Surgery     Patient: JIGAR WATERS , 64y (58)Female   MRN: [id number]  Location: [place] [address] A  Visit: 24 Inpatient  Date: 24 @ 11:32      HPI: 64F with a PMHx of DM2 on insulin, HTN, HLD, hypothyroidism that initially presented to ED RUQ pain associated with nausea and vomiting. At this time AVSS, labs notable for leukocytosis of 13.49, glucose 219, normal Tbili/LFTs, and UA notable for + glucose and ketones. RUQ ultrasound in the ED revealed cholelithiasis w/o evidence of acute cholecystitis with a dilated common bile duct (0.8cm). Patient was admitted and underwent MRCP which demonstrated diffuse dilatation of the common bile duct to the level of the ampulla of Vater of uncertain etiology with no choledocholithiasis or pancreatic head mass. Inpatient plans were for advanced GI to perform possible EUS , however, overnight patient febrile to 101.4 and labs demonstrating W13, Tbili 0.6>3.2 (Direct 2.9), , AST//697, and repeat RUQ US showing cholelithiasis without secondary signs of cholecystitis and CBD dilatation to 1.1cm. Surgery consulted to evaluate, patient endorsing subjective fever/chills with right-sided abd pain, but otherwise denies CP, SOB, or n/v.        PAST MEDICAL HISTORY:  Diabetes    H/O: HTN (hypertension)    Hypothyroid    HLD (hyperlipidemia)        PAST SURGICAL HISTORY:  H/O  section        MEDICATIONS:  aspirin enteric coated 81 milliGRAM(s) Oral daily  dextrose 5%. 1000 milliLiter(s) IV Continuous <Continuous>  dextrose 5%. 1000 milliLiter(s) IV Continuous <Continuous>  dextrose 50% Injectable 25 Gram(s) IV Push once  dextrose 50% Injectable 12.5 Gram(s) IV Push once  dextrose 50% Injectable 25 Gram(s) IV Push once  dextrose Oral Gel 15 Gram(s) Oral once PRN  enoxaparin Injectable 40 milliGRAM(s) SubCutaneous every 24 hours  glucagon  Injectable 1 milliGRAM(s) IntraMuscular once  insulin glargine Injectable (LANTUS) 6 Unit(s) SubCutaneous at bedtime  insulin lispro (ADMELOG) corrective regimen sliding scale   SubCutaneous Before meals and at bedtime  levothyroxine 112 MICROGram(s) Oral daily  lisinopril 20 milliGRAM(s) Oral daily  metoclopramide Injectable 5 milliGRAM(s) IV Push every 8 hours PRN  metoprolol succinate ER 50 milliGRAM(s) Oral daily  pantoprazole    Tablet 40 milliGRAM(s) Oral before breakfast  piperacillin/tazobactam IVPB.- 3.375 Gram(s) IV Intermittent once  piperacillin/tazobactam IVPB.. 3.375 Gram(s) IV Intermittent every 8 hours  polyethylene glycol 3350 17 Gram(s) Oral daily  potassium chloride  20 mEq/100 mL IVPB 20 milliEquivalent(s) IV Intermittent once  rosuvastatin 5 milliGRAM(s) Oral at bedtime  senna 2 Tablet(s) Oral at bedtime      ALLERGIES:  Allergy Status Unknown      VITALS & I/Os:  Vital Signs Last 24 Hrs  T(C): 38.1 (14 Dec 2024 08:00), Max: 38.6 (14 Dec 2024 05:05)  T(F): 100.6 (14 Dec 2024 08:00), Max: 101.4 (14 Dec 2024 05:05)  HR: 102 (14 Dec 2024 05:05) (73 - 102)  BP: 128/66 (14 Dec 2024 05:05) (128/66 - 149/86)  BP(mean): --  RR: 18 (14 Dec 2024 05:05) (18 - 18)  SpO2: 95% (14 Dec 2024 05:05) (95% - 100%)    Parameters below as of 14 Dec 2024 05:05  Patient On (Oxygen Delivery Method): room air        I&O's Summary      PHYSICAL EXAM:  General: No acute distress  Respiratory: Nonlabored  Cardiovascular: RRR  Abdominal: soft, ND, TTP in RUQ but without Hguhes sign  Extremities: Warm    LABS:                        12.6   13.32 )-----------( 261      ( 14 Dec 2024 07:03 )             38.5     12-14    134[L]  |  99  |  13  ----------------------------<  191[H]  3.8   |  20[L]  |  0.56    Ca    8.4      14 Dec 2024 07:03  Phos  1.6       Mg     1.80         TPro  7.1  /  Alb  3.4  /  TBili  3.2[H]  /  DBili  2.9[H]  /  AST  698[H]  /  ALT  697[H]  /  AlkPhos  232[H]      Lactate:        CARDIAC MARKERS ( 13 Dec 2024 12:36 )  x     / x     / x     / x     / 1.5 ng/mL        Urinalysis Basic - ( 14 Dec 2024 07:03 )    Color: x / Appearance: x / SG: x / pH: x  Gluc: 191 mg/dL / Ketone: x  / Bili: x / Urobili: x   Blood: x / Protein: x / Nitrite: x   Leuk Esterase: x / RBC: x / WBC x   Sq Epi: x / Non Sq Epi: x / Bacteria: x

## 2024-12-14 NOTE — PROGRESS NOTE ADULT - ASSESSMENT
Vero Soto is a 63 yo F with a PMHx of DM2 on insulin, HTN, HLD, hypothyroidism presenting with acute RUQ pain associated with nausea and vomiting. Physical exam is notable for RUQ tenderness to palpation, and labs are notable for leukocytosis of 13.5, and + glucose and ketones in urine. DDx includes gastritis/gastroenteritis vs. choledocholithiasis Yes

## 2024-12-14 NOTE — CONSULT NOTE ADULT - ASSESSMENT
64F with a PMHx of DM2 on insulin, HTN, HLD, hypothyroidism that initially presented to ED RUQ pain associated with nausea and vomiting, leukocytosis, and symptomatic cholelithiasis and possible choledocholithiasis. US and MRCP findings showing dilated CBD initially with normals LFTs, however, patient subsequently became febrile with persistent leukocytosis and now elevated Tbili and LFTs (Tbili 0.6>3.2 (Direct 2.9), , AST//697) c/f acute cholangitis ([place] Grade I).       Rec  -  64F with a PMHx of DM2 on insulin, HTN, HLD, hypothyroidism that initially presented to ED RUQ pain associated with nausea and vomiting, leukocytosis, and symptomatic cholelithiasis and possible choledocholithiasis. US and MRCP findings showing dilated CBD initially with normals LFTs, however, patient subsequently became febrile with persistent leukocytosis and now elevated Tbili and LFTs (Tbili 0.6>3.2 (Direct 2.9), , AST//697) c/f acute cholangitis ([place] Grade I).       Rec  - GI note with tentative plans for EUS Monday >would rec follow up for now urgent ERCP iso elevated Tbili, WBCs, and fevers c/f acute cholangitis   - IV abx and f/u blood cultures  - NPO/IVF  - low threshold to escalate care to ICU setting should patient become HD unstable develops AMS, or c/f septic shock   - Patient TPP in RUQ on exam but without sonographic evidence of acute choly > will likely need interval lap choly on this admission after choledocho ruled out on ERCP  - Surgery to follow  - Discussed with ACS attending       B Team [id number]  Isaiah Sierra MD (PGY2)

## 2024-12-14 NOTE — PROGRESS NOTE ADULT - SUBJECTIVE AND OBJECTIVE BOX
Interval Events:   patient febrile to 101.4F overnight, repeat 100.6, associated with mild tachycardia and without hemodynamic instability   she reports chills and diffuse abdominal pains that are worst in the epigastric and RUQ areas  appears uncomfortable on exam with epigastric/RUQ ttp, no guarding or rigidity  labs this AM with rising T Bili to 3.2 (D Bili 2.9) and elevation in liver enzymes  D/w primary team and started on [facility] Medications:  aspirin enteric coated 81 milliGRAM(s) Oral daily  dextrose 5%. 1000 milliLiter(s) IV Continuous <Continuous>  dextrose 5%. 1000 milliLiter(s) IV Continuous <Continuous>  dextrose 50% Injectable 25 Gram(s) IV Push once  dextrose 50% Injectable 12.5 Gram(s) IV Push once  dextrose 50% Injectable 25 Gram(s) IV Push once  dextrose Oral Gel 15 Gram(s) Oral once PRN  enoxaparin Injectable 40 milliGRAM(s) SubCutaneous every 24 hours  glucagon  Injectable 1 milliGRAM(s) IntraMuscular once  insulin glargine Injectable (LANTUS) 6 Unit(s) SubCutaneous at bedtime  insulin lispro (ADMELOG) corrective regimen sliding scale   SubCutaneous Before meals and at bedtime  levothyroxine 112 MICROGram(s) Oral daily  lisinopril 20 milliGRAM(s) Oral daily  metoclopramide Injectable 5 milliGRAM(s) IV Push every 8 hours PRN  metoprolol succinate ER 50 milliGRAM(s) Oral daily  pantoprazole    Tablet 40 milliGRAM(s) Oral before breakfast  piperacillin/tazobactam IVPB.- 3.375 Gram(s) IV Intermittent once  piperacillin/tazobactam IVPB.. 3.375 Gram(s) IV Intermittent every 8 hours  polyethylene glycol 3350 17 Gram(s) Oral daily  potassium chloride  20 mEq/100 mL IVPB 20 milliEquivalent(s) IV Intermittent once  rosuvastatin 5 milliGRAM(s) Oral at bedtime  senna 2 Tablet(s) Oral at bedtime  sodium chloride 0.9%. 1000 milliLiter(s) IV Continuous <Continuous>    ROS: See above.    PHYSICAL EXAM:   Vital Signs:  Vital Signs Last 24 Hrs  T(C): 38.1 (14 Dec 2024 08:00), Max: 38.6 (14 Dec 2024 05:05)  T(F): 100.6 (14 Dec 2024 08:00), Max: 101.4 (14 Dec 2024 05:05)  HR: 102 (14 Dec 2024 05:05) (73 - 102)  BP: 128/66 (14 Dec 2024 05:05) (128/66 - 144/83)  BP(mean): --  RR: 18 (14 Dec 2024 05:05) (18 - 18)  SpO2: 95% (14 Dec 2024 05:05) (95% - 100%)    Parameters below as of 14 Dec 2024 05:05  Patient On (Oxygen Delivery Method): room air    GENERAL:  appears uncomfortable  HEENT:  sclera anicteric  RESPIRATORY:  Normal Effort  CARDIAC:  HDS  ABDOMEN:  Soft, non-distended, Epi/RUQ ttp, no guarding or rebound  SKIN:  Warm & Dry. No jaundice  NEURO:  Alert, conversant, no focal deficit    LABS:                        12.6   13.32 )-----------( 261      ( 14 Dec 2024 07:03 )             38.5     Mean Cell Volume: 80.9 fL (12-14-24 @ 07:03)    12-14    134[L]  |  99  |  13  ----------------------------<  191[H]  3.8   |  20[L]  |  0.56    Ca    8.4      14 Dec 2024 07:03  Phos  1.6     12-14  Mg     1.80     12-14    TPro  7.1  /  Alb  3.4  /  TBili  3.2[H]  /  DBili  2.9[H]  /  AST  698[H]  /  ALT  697[H]  /  AlkPhos  232[H]  12-14    LIVER FUNCTIONS - ( 14 Dec 2024 07:03 )  Alb: 3.4 g/dL / Pro: 7.1 g/dL / ALK PHOS: 232 U/L / ALT: 697 U/L / AST: 698 U/L / GGT: x

## 2024-12-14 NOTE — PROGRESS NOTE ADULT - PROBLEM SELECTOR PLAN 1
RUQ pain associated with N/V. + leukocytosis of 13.49. US in ED revealed cholelithaisis w/o evidence of acute cholecystitis and dilated common bile duct  Troponin: <6  MRCP - unequivocal for acute keo, PE much improved, tolerating diet     Plan  - Advance diet as tolerated   - GI, appreciate recs   - O/p EUS for f/u of dilated CBD, per GI   - No need to trend CMP as no abnormalities in liver enzymes/bili/alk phos RUQ pain associated with N/V. + leukocytosis of 13.49. US in ED revealed cholelithaisis w/o evidence of acute cholecystitis and dilated common bile duct  Troponin: <6  MRCP - unequivocal for acute keo, PE much improved, tolerating diet     Plan  - Advance diet as tolerated   - GI, appreciate recs   - O/p EUS for f/u of dilated CBD, per GI   - Considering CT AP, HIDA, Zosyn, and GS consult concern for acute cholecystitis/Cholangitis RUQ pain associated with N/V. + leukocytosis of 13.49. US in ED revealed cholelithaisis w/o evidence of acute cholecystitis and dilated common bile duct  Troponin: <6  MRCP - unequivocal for acute keo, PE much improved, tolerating diet     Plan  - Advance diet as tolerated   - GI, appreciate recs   - O/p EUS for f/u of dilated CBD, per GI   - Zosyn, and GS consult concern for acute cholecystitis/Cholangitis  - Considering CT AP and HIDA

## 2024-12-15 LAB
ALBUMIN SERPL ELPH-MCNC: 3.3 G/DL — SIGNIFICANT CHANGE UP (ref 3.3–5)
ALP SERPL-CCNC: 192 U/L — HIGH (ref 40–120)
ALT FLD-CCNC: 446 U/L — HIGH (ref 4–33)
ANION GAP SERPL CALC-SCNC: 13 MMOL/L — SIGNIFICANT CHANGE UP (ref 7–14)
APTT BLD: 34.2 SEC — SIGNIFICANT CHANGE UP (ref 24.5–35.6)
AST SERPL-CCNC: 201 U/L — HIGH (ref 4–32)
BASOPHILS # BLD AUTO: 0.04 K/UL — SIGNIFICANT CHANGE UP (ref 0–0.2)
BASOPHILS NFR BLD AUTO: 0.4 % — SIGNIFICANT CHANGE UP (ref 0–2)
BILIRUB DIRECT SERPL-MCNC: 4.8 MG/DL — HIGH (ref 0–0.3)
BILIRUB INDIRECT FLD-MCNC: 0.2 MG/DL — SIGNIFICANT CHANGE UP (ref 0–1)
BILIRUB SERPL-MCNC: 5 MG/DL — HIGH (ref 0.2–1.2)
BLD GP AB SCN SERPL QL: NEGATIVE — SIGNIFICANT CHANGE UP
BUN SERPL-MCNC: 8 MG/DL — SIGNIFICANT CHANGE UP (ref 7–23)
CALCIUM SERPL-MCNC: 8.8 MG/DL — SIGNIFICANT CHANGE UP (ref 8.4–10.5)
CHLORIDE SERPL-SCNC: 102 MMOL/L — SIGNIFICANT CHANGE UP (ref 98–107)
CO2 SERPL-SCNC: 23 MMOL/L — SIGNIFICANT CHANGE UP (ref 22–31)
CREAT SERPL-MCNC: 0.62 MG/DL — SIGNIFICANT CHANGE UP (ref 0.5–1.3)
EGFR: 99 ML/MIN/1.73M2 — SIGNIFICANT CHANGE UP
EOSINOPHIL # BLD AUTO: 0.23 K/UL — SIGNIFICANT CHANGE UP (ref 0–0.5)
EOSINOPHIL NFR BLD AUTO: 2.3 % — SIGNIFICANT CHANGE UP (ref 0–6)
GLUCOSE BLDC GLUCOMTR-MCNC: 192 MG/DL — HIGH (ref 70–99)
GLUCOSE BLDC GLUCOMTR-MCNC: 197 MG/DL — HIGH (ref 70–99)
GLUCOSE BLDC GLUCOMTR-MCNC: 197 MG/DL — HIGH (ref 70–99)
GLUCOSE BLDC GLUCOMTR-MCNC: 265 MG/DL — HIGH (ref 70–99)
GLUCOSE SERPL-MCNC: 163 MG/DL — HIGH (ref 70–99)
HCT VFR BLD CALC: 37.2 % — SIGNIFICANT CHANGE UP (ref 34.5–45)
HGB BLD-MCNC: 11.9 G/DL — SIGNIFICANT CHANGE UP (ref 11.5–15.5)
IANC: 7.53 K/UL — HIGH (ref 1.8–7.4)
IMM GRANULOCYTES NFR BLD AUTO: 0.6 % — SIGNIFICANT CHANGE UP (ref 0–0.9)
INR BLD: 1.26 RATIO — HIGH (ref 0.85–1.16)
LYMPHOCYTES # BLD AUTO: 1.19 K/UL — SIGNIFICANT CHANGE UP (ref 1–3.3)
LYMPHOCYTES # BLD AUTO: 11.7 % — LOW (ref 13–44)
MAGNESIUM SERPL-MCNC: 2.2 MG/DL — SIGNIFICANT CHANGE UP (ref 1.6–2.6)
MCHC RBC-ENTMCNC: 26.9 PG — LOW (ref 27–34)
MCHC RBC-ENTMCNC: 32 G/DL — SIGNIFICANT CHANGE UP (ref 32–36)
MCV RBC AUTO: 84 FL — SIGNIFICANT CHANGE UP (ref 80–100)
MONOCYTES # BLD AUTO: 1.08 K/UL — HIGH (ref 0–0.9)
MONOCYTES NFR BLD AUTO: 10.7 % — SIGNIFICANT CHANGE UP (ref 2–14)
NEUTROPHILS # BLD AUTO: 7.53 K/UL — HIGH (ref 1.8–7.4)
NEUTROPHILS NFR BLD AUTO: 74.3 % — SIGNIFICANT CHANGE UP (ref 43–77)
NRBC # BLD: 0 /100 WBCS — SIGNIFICANT CHANGE UP (ref 0–0)
NRBC # FLD: 0 K/UL — SIGNIFICANT CHANGE UP (ref 0–0)
PHOSPHATE SERPL-MCNC: 1.9 MG/DL — LOW (ref 2.5–4.5)
PLATELET # BLD AUTO: 236 K/UL — SIGNIFICANT CHANGE UP (ref 150–400)
POTASSIUM SERPL-MCNC: 3.8 MMOL/L — SIGNIFICANT CHANGE UP (ref 3.5–5.3)
POTASSIUM SERPL-SCNC: 3.8 MMOL/L — SIGNIFICANT CHANGE UP (ref 3.5–5.3)
PROT SERPL-MCNC: 7.3 G/DL — SIGNIFICANT CHANGE UP (ref 6–8.3)
PROTHROM AB SERPL-ACNC: 14.6 SEC — HIGH (ref 9.9–13.4)
RBC # BLD: 4.43 M/UL — SIGNIFICANT CHANGE UP (ref 3.8–5.2)
RBC # FLD: 14.6 % — HIGH (ref 10.3–14.5)
RH IG SCN BLD-IMP: POSITIVE — SIGNIFICANT CHANGE UP
SODIUM SERPL-SCNC: 138 MMOL/L — SIGNIFICANT CHANGE UP (ref 135–145)
WBC # BLD: 10.13 K/UL — SIGNIFICANT CHANGE UP (ref 3.8–10.5)
WBC # FLD AUTO: 10.13 K/UL — SIGNIFICANT CHANGE UP (ref 3.8–10.5)

## 2024-12-15 PROCEDURE — 99233 SBSQ HOSP IP/OBS HIGH 50: CPT | Mod: GC

## 2024-12-15 RX ORDER — HEPARIN SODIUM 5000 [USP'U]/.5ML
30 INJECTION, SOLUTION INTRAVENOUS; SUBCUTANEOUS ONCE
Refills: 0 | Status: COMPLETED | OUTPATIENT
Start: 2024-12-15 | End: 2024-12-15

## 2024-12-15 RX ORDER — SODIUM,POTASSIUM PHOSPHATES 278-250MG
1 POWDER IN PACKET (EA) ORAL ONCE
Refills: 0 | Status: COMPLETED | OUTPATIENT
Start: 2024-12-15 | End: 2024-12-15

## 2024-12-15 RX ADMIN — Medication 81 MILLIGRAM(S): at 11:32

## 2024-12-15 RX ADMIN — PIPERACILLIN SODIUM AND TAZOBACTAM SODIUM 25 GRAM(S): 4; .5 INJECTION, POWDER, LYOPHILIZED, FOR SOLUTION INTRAVENOUS at 13:24

## 2024-12-15 RX ADMIN — PIPERACILLIN SODIUM AND TAZOBACTAM SODIUM 25 GRAM(S): 4; .5 INJECTION, POWDER, LYOPHILIZED, FOR SOLUTION INTRAVENOUS at 05:06

## 2024-12-15 RX ADMIN — LISINOPRIL 20 MILLIGRAM(S): 20 TABLET ORAL at 06:30

## 2024-12-15 RX ADMIN — METOPROLOL TARTRATE 50 MILLIGRAM(S): 100 TABLET, FILM COATED ORAL at 06:30

## 2024-12-15 RX ADMIN — POLYETHYLENE GLYCOL 3350 17 GRAM(S): 17 POWDER, FOR SOLUTION ORAL at 11:32

## 2024-12-15 RX ADMIN — ROSUVASTATIN CALCIUM 5 MILLIGRAM(S): 5 TABLET, FILM COATED ORAL at 21:13

## 2024-12-15 RX ADMIN — Medication 1: at 23:12

## 2024-12-15 RX ADMIN — HEPARIN SODIUM 85 MILLIMOLE(S): 5000 INJECTION, SOLUTION INTRAVENOUS; SUBCUTANEOUS at 11:32

## 2024-12-15 RX ADMIN — Medication 112 MICROGRAM(S): at 04:59

## 2024-12-15 RX ADMIN — INSULIN GLARGINE 6 UNIT(S): 100 INJECTION, SOLUTION SUBCUTANEOUS at 23:10

## 2024-12-15 RX ADMIN — PIPERACILLIN SODIUM AND TAZOBACTAM SODIUM 25 GRAM(S): 4; .5 INJECTION, POWDER, LYOPHILIZED, FOR SOLUTION INTRAVENOUS at 21:10

## 2024-12-15 RX ADMIN — Medication 1 PACKET(S): at 13:23

## 2024-12-15 RX ADMIN — PANTOPRAZOLE SODIUM 40 MILLIGRAM(S): 40 TABLET, DELAYED RELEASE ORAL at 06:30

## 2024-12-15 NOTE — PROGRESS NOTE ADULT - PROBLEM SELECTOR PLAN 1
RUQ pain associated with N/V. + leukocytosis of 13.49. US in ED revealed cholelithaisis w/o evidence of acute cholecystitis and dilated common bile duct  Troponin: <6  MRCP - unequivocal for acute keo, PE much improved, tolerating diet     Plan  - Advance diet as tolerated   - GI, appreciate recs   - O/p EUS for f/u of dilated CBD, per GI   - Zosyn, and GS consult concern for acute cholecystitis/Cholangitis  - Considering CT AP and HIDA RUQ pain associated with N/V. + leukocytosis of 13.49. US in ED revealed cholelithaisis w/o evidence of acute cholecystitis and dilated common bile duct  Troponin: <6  MRCP - unequivocal for acute keo, PE much improved, tolerating diet     Plan  - Advance diet as tolerated   - GI, appreciate recs   - EUS/ERCP plan for Monday 12/16  - Zosyn  - Surg recommends interval lap keo this admit  - Trend liver enzymes

## 2024-12-15 NOTE — PROGRESS NOTE ADULT - ASSESSMENT
64F with a PMHx of DM2 on insulin, HTN, HLD, hypothyroidism that initially presented to ED RUQ pain associated with nausea and vomiting, leukocytosis, and symptomatic cholelithiasis and possible choledocholithiasis. US and MRCP findings showing dilated CBD initially with normals LFTs, however, patient subsequently became febrile with persistent leukocytosis and now elevated Tbili and LFTs (Tbili 0.6>3.2 (Direct 2.9), , AST//697) c/f acute cholangitis ([place] Grade I).       Rec  - Plan for EUS /ERCP tomorrow  - IV abx and f/u blood cultures  - NPO @ midnight /IVF  - low threshold to escalate care to ICU setting should patient become HD unstable develops AMS, or c/f septic shock   - Patient TPP in RUQ on exam but without sonographic evidence of acute choly > will likely need interval lap keo on this admission after choledocho ruled out on ERCP  - Surgery to follow    B Team [id number]

## 2024-12-15 NOTE — PROGRESS NOTE ADULT - SUBJECTIVE AND OBJECTIVE BOX
B SURGERY PROGRESS NOTE ([id number])    SUBJECTIVE:  Patient seen and examined at bedside. Patient reports pain is well controlled.   Denies nausea, vomiting. Tolerating diet  Plan for EUS/ERCP oliva with GI    OBJECTIVE:  Vital Signs Last 24 Hrs  T(C): 36.6 (15 Dec 2024 13:51), Max: 37.1 (15 Dec 2024 06:04)  T(F): 97.9 (15 Dec 2024 13:51), Max: 98.7 (15 Dec 2024 06:04)  HR: 84 (15 Dec 2024 13:51) (77 - 84)  BP: 114/78 (15 Dec 2024 13:51) (114/78 - 121/73)  BP(mean): --  RR: 17 (15 Dec 2024 13:51) (17 - 18)  SpO2: 99% (15 Dec 2024 13:51) (97% - 100%)    Parameters below as of 15 Dec 2024 13:51  Patient On (Oxygen Delivery Method): room air        Physical Examination:  GEN: NAD, resting quietly  NEURO: AAOx3, CN II-XII grossly intact, no focal deficits  PULM: symmetric chest rise bilaterally, no increased WOB  ABD: soft, mild RUQ TTP, nondistended  EXTR: no lower extremity edema, moving all extremities

## 2024-12-15 NOTE — CHART NOTE - NSCHARTNOTEFT_GEN_A_CORE
Patient seen and examined at bedside. She reports diaphoresis overnight, otherwise feeling much better with resolution of abdominal pain following initiation of antibiotics. She is afebrile and vitally stable. Patient appears comfortable and nontoxic with a soft, nontender abdomen. AM labs pending. Okay for a regular diet today. NPO after midnight for EUS with ERCP Monday. Please obtain 4AM labs Monday.    Patrick Pena MD  Gastroenterology/Hepatology Fellow

## 2024-12-15 NOTE — PROGRESS NOTE ADULT - SUBJECTIVE AND OBJECTIVE BOX
DATE OF SERVICE: 12-15-24 @ 07:34    Patient is a 64y old  Female who presents with a chief complaint of Abdominal pain (14 Dec 2024 13:32)      SUBJECTIVE / OVERNIGHT EVENTS:  Overnight,  Pt seen and examined at bedside.    ROS negative except as above.    MEDICATIONS  (STANDING):  aspirin enteric coated 81 milliGRAM(s) Oral daily  dextrose 5%. 1000 milliLiter(s) (100 mL/Hr) IV Continuous <Continuous>  dextrose 5%. 1000 milliLiter(s) (50 mL/Hr) IV Continuous <Continuous>  dextrose 50% Injectable 25 Gram(s) IV Push once  dextrose 50% Injectable 12.5 Gram(s) IV Push once  dextrose 50% Injectable 25 Gram(s) IV Push once  enoxaparin Injectable 40 milliGRAM(s) SubCutaneous every 24 hours  glucagon  Injectable 1 milliGRAM(s) IntraMuscular once  insulin glargine Injectable (LANTUS) 6 Unit(s) SubCutaneous at bedtime  insulin lispro (ADMELOG) corrective regimen sliding scale   SubCutaneous Before meals and at bedtime  levothyroxine 112 MICROGram(s) Oral daily  lisinopril 20 milliGRAM(s) Oral daily  metoprolol succinate ER 50 milliGRAM(s) Oral daily  pantoprazole    Tablet 40 milliGRAM(s) Oral before breakfast  piperacillin/tazobactam IVPB.. 3.375 Gram(s) IV Intermittent every 8 hours  polyethylene glycol 3350 17 Gram(s) Oral daily  rosuvastatin 5 milliGRAM(s) Oral at bedtime  senna 2 Tablet(s) Oral at bedtime  sodium chloride 0.9%. 1000 milliLiter(s) (60 mL/Hr) IV Continuous <Continuous>    MEDICATIONS  (PRN):  dextrose Oral Gel 15 Gram(s) Oral once PRN Blood Glucose LESS THAN 70 milliGRAM(s)/deciliter  metoclopramide Injectable 5 milliGRAM(s) IV Push every 8 hours PRN n/v      Vital Signs Last 24 Hrs  T(C): 37.1 (15 Dec 2024 06:04), Max: 38.1 (14 Dec 2024 08:00)  T(F): 98.7 (15 Dec 2024 06:04), Max: 100.6 (14 Dec 2024 08:00)  HR: 77 (15 Dec 2024 06:04) (76 - 81)  BP: 121/73 (15 Dec 2024 06:04) (115/71 - 149/70)  BP(mean): --  RR: 18 (15 Dec 2024 06:04) (17 - 18)  SpO2: 100% (15 Dec 2024 06:04) (97% - 100%)    Parameters below as of 15 Dec 2024 06:04  Patient On (Oxygen Delivery Method): room air      CAPILLARY BLOOD GLUCOSE      POCT Blood Glucose.: 167 mg/dL (14 Dec 2024 21:16)  POCT Blood Glucose.: 190 mg/dL (14 Dec 2024 17:57)  POCT Blood Glucose.: 236 mg/dL (14 Dec 2024 12:31)  POCT Blood Glucose.: 202 mg/dL (14 Dec 2024 08:27)    I&O's Summary      PHYSICAL EXAM:  GENERAL: NAD, well-developed  HEAD:  Atraumatic, Normocephalic  EYES: EOMI, conjunctiva and sclera clear  NECK: Supple, No JVD  CHEST/LUNG: Clear to auscultation bilaterally; No wheeze  HEART: Regular rate and rhythm; No murmurs, rubs, or gallops  ABDOMEN: Soft, Nontender, Nondistended; Bowel sounds present  EXTREMITIES:  2+ Peripheral Pulses, No clubbing, cyanosis, or edema  NEUROLOGY: AOx3, non-focal  SKIN: No rashes or lesions    LABS:                        12.6   13.32 )-----------( 261      ( 14 Dec 2024 07:03 )             38.5     12-14    134[L]  |  99  |  13  ----------------------------<  191[H]  3.8   |  20[L]  |  0.56    Ca    8.4      14 Dec 2024 07:03  Phos  1.6     12-14  Mg     1.80     12-14    TPro  7.1  /  Alb  3.4  /  TBili  3.2[H]  /  DBili  2.9[H]  /  AST  698[H]  /  ALT  697[H]  /  AlkPhos  232[H]  12-14    PT/INR - ( 14 Dec 2024 14:50 )   PT: 14.9 sec;   INR: 1.25 ratio         PTT - ( 14 Dec 2024 14:50 )  PTT:30.5 sec  CARDIAC MARKERS ( 13 Dec 2024 12:36 )  x     / x     / x     / x     / 1.5 ng/mL        MICRO:      RADIOLOGY & ADDITIONAL TESTS:           DATE OF SERVICE: 12-15-24 @ 07:34    Patient is a 64y old  Female who presents with a chief complaint of Abdominal pain (14 Dec 2024 13:32)      SUBJECTIVE / OVERNIGHT EVENTS:  Overnight, no acute events.   Pt seen and examined at bedside. Pt reports improved abdominal pain. No N/v. Can tolerate PO intake liquids. Hungry.     ROS negative except as above.    MEDICATIONS  (STANDING):  aspirin enteric coated 81 milliGRAM(s) Oral daily  dextrose 5%. 1000 milliLiter(s) (100 mL/Hr) IV Continuous <Continuous>  dextrose 5%. 1000 milliLiter(s) (50 mL/Hr) IV Continuous <Continuous>  dextrose 50% Injectable 25 Gram(s) IV Push once  dextrose 50% Injectable 12.5 Gram(s) IV Push once  dextrose 50% Injectable 25 Gram(s) IV Push once  enoxaparin Injectable 40 milliGRAM(s) SubCutaneous every 24 hours  glucagon  Injectable 1 milliGRAM(s) IntraMuscular once  insulin glargine Injectable (LANTUS) 6 Unit(s) SubCutaneous at bedtime  insulin lispro (ADMELOG) corrective regimen sliding scale   SubCutaneous Before meals and at bedtime  levothyroxine 112 MICROGram(s) Oral daily  lisinopril 20 milliGRAM(s) Oral daily  metoprolol succinate ER 50 milliGRAM(s) Oral daily  pantoprazole    Tablet 40 milliGRAM(s) Oral before breakfast  piperacillin/tazobactam IVPB.. 3.375 Gram(s) IV Intermittent every 8 hours  polyethylene glycol 3350 17 Gram(s) Oral daily  rosuvastatin 5 milliGRAM(s) Oral at bedtime  senna 2 Tablet(s) Oral at bedtime  sodium chloride 0.9%. 1000 milliLiter(s) (60 mL/Hr) IV Continuous <Continuous>    MEDICATIONS  (PRN):  dextrose Oral Gel 15 Gram(s) Oral once PRN Blood Glucose LESS THAN 70 milliGRAM(s)/deciliter  metoclopramide Injectable 5 milliGRAM(s) IV Push every 8 hours PRN n/v      Vital Signs Last 24 Hrs  T(C): 37.1 (15 Dec 2024 06:04), Max: 38.1 (14 Dec 2024 08:00)  T(F): 98.7 (15 Dec 2024 06:04), Max: 100.6 (14 Dec 2024 08:00)  HR: 77 (15 Dec 2024 06:04) (76 - 81)  BP: 121/73 (15 Dec 2024 06:04) (115/71 - 149/70)  BP(mean): --  RR: 18 (15 Dec 2024 06:04) (17 - 18)  SpO2: 100% (15 Dec 2024 06:04) (97% - 100%)    Parameters below as of 15 Dec 2024 06:04  Patient On (Oxygen Delivery Method): room air      CAPILLARY BLOOD GLUCOSE      POCT Blood Glucose.: 167 mg/dL (14 Dec 2024 21:16)  POCT Blood Glucose.: 190 mg/dL (14 Dec 2024 17:57)  POCT Blood Glucose.: 236 mg/dL (14 Dec 2024 12:31)  POCT Blood Glucose.: 202 mg/dL (14 Dec 2024 08:27)    I&O's Summary      PHYSICAL EXAM:  GENERAL: NAD, well-developed  HEAD:  Atraumatic, Normocephalic  EYES: EOMI, conjunctiva and sclera clear  NECK: Supple, No JVD  CHEST/LUNG: Clear to auscultation bilaterally; No wheeze  HEART: Regular rate and rhythm; No murmurs, rubs, or gallops  ABDOMEN: Soft, RUQ TTP, Nondistended; Bowel sounds present  EXTREMITIES:  2+ Peripheral Pulses, No clubbing, cyanosis, or edema  NEUROLOGY: AOx3, non-focal  SKIN: No rashes or lesions    LABS:                        12.6   13.32 )-----------( 261      ( 14 Dec 2024 07:03 )             38.5     12-14    134[L]  |  99  |  13  ----------------------------<  191[H]  3.8   |  20[L]  |  0.56    Ca    8.4      14 Dec 2024 07:03  Phos  1.6     12-14  Mg     1.80     12-14    TPro  7.1  /  Alb  3.4  /  TBili  3.2[H]  /  DBili  2.9[H]  /  AST  698[H]  /  ALT  697[H]  /  AlkPhos  232[H]  12-14    PT/INR - ( 14 Dec 2024 14:50 )   PT: 14.9 sec;   INR: 1.25 ratio         PTT - ( 14 Dec 2024 14:50 )  PTT:30.5 sec  CARDIAC MARKERS ( 13 Dec 2024 12:36 )  x     / x     / x     / x     / 1.5 ng/mL        MICRO:      RADIOLOGY & ADDITIONAL TESTS:

## 2024-12-16 LAB
ALBUMIN SERPL ELPH-MCNC: 2.9 G/DL — LOW (ref 3.3–5)
ALP SERPL-CCNC: 179 U/L — HIGH (ref 40–120)
ALT FLD-CCNC: 326 U/L — HIGH (ref 4–33)
ANION GAP SERPL CALC-SCNC: 10 MMOL/L — SIGNIFICANT CHANGE UP (ref 7–14)
APTT BLD: 36.3 SEC — HIGH (ref 24.5–35.6)
AST SERPL-CCNC: 128 U/L — HIGH (ref 4–32)
BILIRUB SERPL-MCNC: 2.6 MG/DL — HIGH (ref 0.2–1.2)
BLD GP AB SCN SERPL QL: NEGATIVE — SIGNIFICANT CHANGE UP
BUN SERPL-MCNC: 7 MG/DL — SIGNIFICANT CHANGE UP (ref 7–23)
CALCIUM SERPL-MCNC: 8.3 MG/DL — LOW (ref 8.4–10.5)
CHLORIDE SERPL-SCNC: 102 MMOL/L — SIGNIFICANT CHANGE UP (ref 98–107)
CO2 SERPL-SCNC: 21 MMOL/L — LOW (ref 22–31)
CREAT SERPL-MCNC: 0.58 MG/DL — SIGNIFICANT CHANGE UP (ref 0.5–1.3)
EGFR: 100 ML/MIN/1.73M2 — SIGNIFICANT CHANGE UP
GLUCOSE BLDC GLUCOMTR-MCNC: 108 MG/DL — HIGH (ref 70–99)
GLUCOSE BLDC GLUCOMTR-MCNC: 121 MG/DL — HIGH (ref 70–99)
GLUCOSE BLDC GLUCOMTR-MCNC: 167 MG/DL — HIGH (ref 70–99)
GLUCOSE BLDC GLUCOMTR-MCNC: 178 MG/DL — HIGH (ref 70–99)
GLUCOSE BLDC GLUCOMTR-MCNC: 197 MG/DL — HIGH (ref 70–99)
GLUCOSE BLDC GLUCOMTR-MCNC: 97 MG/DL — SIGNIFICANT CHANGE UP (ref 70–99)
GLUCOSE SERPL-MCNC: 179 MG/DL — HIGH (ref 70–99)
HCT VFR BLD CALC: 34 % — LOW (ref 34.5–45)
HGB BLD-MCNC: 10.8 G/DL — LOW (ref 11.5–15.5)
INR BLD: 1.1 RATIO — SIGNIFICANT CHANGE UP (ref 0.85–1.16)
MAGNESIUM SERPL-MCNC: 1.9 MG/DL — SIGNIFICANT CHANGE UP (ref 1.6–2.6)
MCHC RBC-ENTMCNC: 26.2 PG — LOW (ref 27–34)
MCHC RBC-ENTMCNC: 31.8 G/DL — LOW (ref 32–36)
MCV RBC AUTO: 82.5 FL — SIGNIFICANT CHANGE UP (ref 80–100)
NRBC # BLD: 0 /100 WBCS — SIGNIFICANT CHANGE UP (ref 0–0)
NRBC # FLD: 0 K/UL — SIGNIFICANT CHANGE UP (ref 0–0)
PHOSPHATE SERPL-MCNC: 2.2 MG/DL — LOW (ref 2.5–4.5)
PLATELET # BLD AUTO: 230 K/UL — SIGNIFICANT CHANGE UP (ref 150–400)
POTASSIUM SERPL-MCNC: 3.6 MMOL/L — SIGNIFICANT CHANGE UP (ref 3.5–5.3)
POTASSIUM SERPL-SCNC: 3.6 MMOL/L — SIGNIFICANT CHANGE UP (ref 3.5–5.3)
PROT SERPL-MCNC: 6.5 G/DL — SIGNIFICANT CHANGE UP (ref 6–8.3)
PROTHROM AB SERPL-ACNC: 13.1 SEC — SIGNIFICANT CHANGE UP (ref 9.9–13.4)
RBC # BLD: 4.12 M/UL — SIGNIFICANT CHANGE UP (ref 3.8–5.2)
RBC # FLD: 14.6 % — HIGH (ref 10.3–14.5)
RH IG SCN BLD-IMP: POSITIVE — SIGNIFICANT CHANGE UP
SODIUM SERPL-SCNC: 133 MMOL/L — LOW (ref 135–145)
WBC # BLD: 7.19 K/UL — SIGNIFICANT CHANGE UP (ref 3.8–10.5)
WBC # FLD AUTO: 7.19 K/UL — SIGNIFICANT CHANGE UP (ref 3.8–10.5)

## 2024-12-16 PROCEDURE — 99233 SBSQ HOSP IP/OBS HIGH 50: CPT | Mod: GC

## 2024-12-16 PROCEDURE — 43259 EGD US EXAM DUODENUM/JEJUNUM: CPT | Mod: GC

## 2024-12-16 RX ORDER — CHLORHEXIDINE GLUCONATE 1.2 MG/ML
1 RINSE ORAL EVERY 12 HOURS
Refills: 0 | Status: COMPLETED | OUTPATIENT
Start: 2024-12-16 | End: 2024-12-17

## 2024-12-16 RX ORDER — POTASSIUM PHOSPHATE, MONOBASIC POTASSIUM PHOSPHATE, DIBASIC INJECTION, 236; 224 MG/ML; MG/ML
30 SOLUTION, CONCENTRATE INTRAVENOUS ONCE
Refills: 0 | Status: COMPLETED | OUTPATIENT
Start: 2024-12-16 | End: 2024-12-16

## 2024-12-16 RX ADMIN — PIPERACILLIN SODIUM AND TAZOBACTAM SODIUM 25 GRAM(S): 4; .5 INJECTION, POWDER, LYOPHILIZED, FOR SOLUTION INTRAVENOUS at 22:29

## 2024-12-16 RX ADMIN — PIPERACILLIN SODIUM AND TAZOBACTAM SODIUM 25 GRAM(S): 4; .5 INJECTION, POWDER, LYOPHILIZED, FOR SOLUTION INTRAVENOUS at 05:10

## 2024-12-16 RX ADMIN — ROSUVASTATIN CALCIUM 5 MILLIGRAM(S): 5 TABLET, FILM COATED ORAL at 22:29

## 2024-12-16 RX ADMIN — POTASSIUM PHOSPHATE, MONOBASIC POTASSIUM PHOSPHATE, DIBASIC INJECTION, 83.33 MILLIMOLE(S): 236; 224 SOLUTION, CONCENTRATE INTRAVENOUS at 08:37

## 2024-12-16 RX ADMIN — INSULIN GLARGINE 6 UNIT(S): 100 INJECTION, SOLUTION SUBCUTANEOUS at 22:29

## 2024-12-16 RX ADMIN — Medication 1 APPLICATION(S): at 19:29

## 2024-12-16 RX ADMIN — PIPERACILLIN SODIUM AND TAZOBACTAM SODIUM 25 GRAM(S): 4; .5 INJECTION, POWDER, LYOPHILIZED, FOR SOLUTION INTRAVENOUS at 13:49

## 2024-12-16 NOTE — PROVIDER CONTACT NOTE (OTHER) - DATE AND TIME:
14-Dec-2024 05:10
14-Dec-2024 08:00
16-Dec-2024 19:34
13-Dec-2024 12:34
11-Dec-2024 10:19
14-Dec-2024 09:10
14-Dec-2024 11:30
14-Dec-2024 11:50
14-Dec-2024 13:39
11-Dec-2024 21:15

## 2024-12-16 NOTE — PROVIDER CONTACT NOTE (OTHER) - ACTION/TREATMENT ORDERED:
MD notified and made aware, changed FS q6h to q4h, redo fs at 23:00.   Repeat FS at 23:00 is 95, administered Lantus 6 units
ok to go to the floor
MD notified and made aware. No new interventions ordered at this time.
md stated that they will be not be modifying the diet to include medications. As a result I will not be giving po meds at 0600. md stated okay to hold
MD notified and made aware. No new interventions ordered at this time.
MD notified and made aware. No new interventions ordered at this time.
MD notified and made aware, tylenol administered

## 2024-12-16 NOTE — PROVIDER CONTACT NOTE (OTHER) - RECOMMENDATIONS
Notify MD.
Notify MD.
notify md
Notify MD.
Notify MD.
notify MD, order tylenol
Notify MD.
Notify MD.
notify MD, Lower Lantus dose or change fluids to prevent hypoglycemia

## 2024-12-16 NOTE — PROGRESS NOTE ADULT - ASSESSMENT
65 yo F w/ PMHx of T2DM, HTN, HLD, hypothyroidism who initially presented to ED RUQ pain a/w N/V, leukocytosis, and symptomatic cholelithiasis and possible choledocholithiasis. US and MRCP findings showing dilated CBD initially with normals LFTs, however, patient subsequently became febrile with persistent leukocytosis and now elevated Tbili and LFTs (Tbili 0.6>3.2 (Direct 2.9), , AST//697) c/f acute cholangitis ([place] Grade I). Surgery consulted for evaluation     Recommendations:   - Plan for EUS/ERCP w/ GI today      - Possible OR Wed, 12/17 for cholecystectomy pending above findings   - Patient remains afebrile on IV abx. Blood cx 12/14 NGTD   - Will follow      B Team Surgery   [id number]

## 2024-12-16 NOTE — PROVIDER CONTACT NOTE (OTHER) - REASON
Patient states she saw blood in her urine.
Potassium chloride administration delayed.
pts diet order states strict npo
FS 76
Patient refused Miralax.
Potassium chloride administration delayed.
temp 101.4 hr102
ok to transfer to the floor
Patient refused Miralax.
Patient temperature 100.6 after administration of tylenol.

## 2024-12-16 NOTE — PROVIDER CONTACT NOTE (OTHER) - NAME OF MD/NP/PA/DO NOTIFIED:
MD Lane Zavaleta
MD Mynor Bains
Janine Pierce MD
MD Janine Pierce
Lane Rausch MD
MD Alexy Briceno
MD Mynor Bains
odilon paniagua md
MD Mynor Bains
MD Mynor Bains

## 2024-12-16 NOTE — PROGRESS NOTE ADULT - PROBLEM SELECTOR PLAN 1
RUQ pain associated with N/V. + leukocytosis of 13.49. US in ED revealed cholelithaisis w/o evidence of acute cholecystitis and dilated common bile duct  Troponin: <6  MRCP - unequivocal for acute keo, PE much improved, tolerating diet     Plan  - Advance diet as tolerated   - GI, appreciate recs   - EUS/ERCP plan for Monday 12/16  - Zosyn  - Surg recommends interval lap keo this admit  - Trend liver enzymes RUQ pain associated with N/V. + leukocytosis of 13.49. US in ED revealed cholelithaisis w/o evidence of acute cholecystitis and dilated common bile duct  Troponin: <6  MRCP - unequivocal for acute keo, PE much improved, tolerating diet     Plan  - NPO for today's procedure   - GI, appreciate recs   - EUS/ERCP plan for Monday 12/16  - Zosyn for now, c/f ascending cholangitis   - Surg recommends interval lap keo this admit  - Trend liver enzymes

## 2024-12-16 NOTE — PROVIDER CONTACT NOTE (OTHER) - ASSESSMENT
Patient assessed. No signs or symptoms of acute distress noted.
Patient currently receiving IV sodium phosphate.
Patient assessed. No signs or symptoms of acute distress noted.
Patient assessed. No signs or symptoms of acute distress noted.
patient is A&Ox4, denies dizziness, blurry vision. no other acute distress noted
not something that requires a pt assessment
Patient currently receiving IV sodium phosphate.
patient is A&ox4, complaints of chills and headache, no acute distress noted
Patient assessed. No signs or symptoms of acute distress noted.

## 2024-12-16 NOTE — PROVIDER CONTACT NOTE (OTHER) - BACKGROUND
Patient admitted with other specified disorders of biliary tract.   PMH of HLD, HTN, diabetes, hypothyroid
patient admitted with other specified disorders of biliary tract
Patient admitted with other specified disorders of biliary tract.   PMH of HLD, hypothyroid, HTN diabetes.
patient admitted with specified disorders of biliary tract
Patient admitted with other specified disorders of biliary tract.   PMH of HLD, HTN, diabetes, hypothyroid
53yof admitting dx abdominal pain, pmhx lupus, copd, asthma, ptx
Patient admitted with other specified disorders of biliary tract.   PMH of HLD, hypothyroid, HTN, diabetes

## 2024-12-16 NOTE — PROVIDER CONTACT NOTE (OTHER) - SITUATION
patient due to be transferred to the floor finger stick 99, patient currently npo
temp 101.4 hr102
Patient states she saw blood in her urine.
Patient temperature 100.6 after administration of tylenol.
Potassium chloride administration delayed due to incompatibility with other IV medications and unable to obtain secondary IV access.
FS 76, Lantus 6 units ordered. Patient currently NPO
Patient refused Miralax.
Potassium chloride administration delayed due to incompatibility with other IV medications and unable to obtain secondary IV access.
pts diet order states strict npo - requesting if okay to give po meds
Patient refused Miralax.

## 2024-12-16 NOTE — PROGRESS NOTE ADULT - SUBJECTIVE AND OBJECTIVE BOX
B TEAM SURGERY DAILY PROGRESS NOTE    SUBJECTIVE:   Overnight: no acute events   Patient seen and evaluated on AM rounds. Abdominal pain improving.        OBJECTIVE:  Vital Signs Last 24 Hrs  T(C): 36.2 (16 Dec 2024 13:53), Max: 37.8 (15 Dec 2024 21:49)  T(F): 97.2 (16 Dec 2024 13:53), Max: 100 (15 Dec 2024 21:49)  HR: 74 (16 Dec 2024 13:53) (65 - 83)  BP: 146/87 (16 Dec 2024 13:53) (118/60 - 146/87)  RR: 21 (16 Dec 2024 13:53) (16 - 21)  SpO2: 100% (16 Dec 2024 13:53) (97% - 100%)    Parameters below as of 16 Dec 2024 13:53  Patient On (Oxygen Delivery Method): room air      STANDING  aspirin enteric coated 81 milliGRAM(s) Oral daily  chlorhexidine 2% Cloths 1 Application(s) Topical every 12 hours  dextrose 5%. 1000 milliLiter(s) (100 mL/Hr) IV Continuous <Continuous>  dextrose 5%. 1000 milliLiter(s) (50 mL/Hr) IV Continuous <Continuous>  dextrose 50% Injectable 25 Gram(s) IV Push once  dextrose 50% Injectable 12.5 Gram(s) IV Push once  dextrose 50% Injectable 25 Gram(s) IV Push once  glucagon  Injectable 1 milliGRAM(s) IntraMuscular once  insulin glargine Injectable (LANTUS) 6 Unit(s) SubCutaneous at bedtime  insulin lispro (ADMELOG) corrective regimen sliding scale   SubCutaneous every 6 hours  levothyroxine 112 MICROGram(s) Oral daily  lisinopril 20 milliGRAM(s) Oral daily  metoprolol succinate ER 50 milliGRAM(s) Oral daily  mupirocin 2% Ointment 1 Application(s) Both Nostrils two times a day  pantoprazole    Tablet 40 milliGRAM(s) Oral before breakfast  piperacillin/tazobactam IVPB.. 3.375 Gram(s) IV Intermittent every 8 hours  polyethylene glycol 3350 17 Gram(s) Oral daily  rosuvastatin 5 milliGRAM(s) Oral at bedtime  senna 2 Tablet(s) Oral at bedtime  sodium chloride 0.9%. 1000 milliLiter(s) (60 mL/Hr) IV Continuous <Continuous>    PRN  dextrose Oral Gel 15 Gram(s) Oral once PRN Blood Glucose LESS THAN 70 milliGRAM(s)/deciliter  metoclopramide Injectable 5 milliGRAM(s) IV Push every 8 hours PRN n/v      Labs:  133[L]  |  102  |  7  ----------------------------<  179[H]    (12-16)  3.6   |  21[L]  |  0.58          Ca    8.3[L]      12-16  Mg    1.90  Phos  2.2[L]            Urinalysis Basic - ( 16 Dec 2024 03:40 )    Color: x / Appearance: x / SG: x / pH: x  Gluc: 179 mg/dL / Ketone: x  / Bili: x / Urobili: x   Blood: x / Protein: x / Nitrite: x   Leuk Esterase: x / RBC: x / WBC x   Sq Epi: x / Non Sq Epi: x / Bacteria: x      Urinalysis Basic - ( 16 Dec 2024 03:40 )    Color: x / Appearance: x / SG: x / pH: x  Gluc: 179 mg/dL / Ketone: x  / Bili: x / Urobili: x   Blood: x / Protein: x / Nitrite: x   Leuk Esterase: x / RBC: x / WBC x   Sq Epi: x / Non Sq Epi: x / Bacteria: x        Physical Exam:  General: NAD  Cardiovascular: appears well perfused   Respiratory: respirations non labored  Gastrointestinal: soft, nontender, nondistended  Extremities: FROM, warm  Neurological: A+Ox3

## 2024-12-16 NOTE — PROGRESS NOTE ADULT - SUBJECTIVE AND OBJECTIVE BOX
DATE OF SERVICE: 12-16-24 @ 07:22    Patient is a 64y old  Female who presents with a chief complaint of Abdominal pain (15 Dec 2024 19:03)      SUBJECTIVE / OVERNIGHT EVENTS:  Overnight,  Pt seen and examined at bedside.    ROS negative except as above.    MEDICATIONS  (STANDING):  aspirin enteric coated 81 milliGRAM(s) Oral daily  dextrose 5%. 1000 milliLiter(s) (100 mL/Hr) IV Continuous <Continuous>  dextrose 5%. 1000 milliLiter(s) (50 mL/Hr) IV Continuous <Continuous>  dextrose 50% Injectable 25 Gram(s) IV Push once  dextrose 50% Injectable 12.5 Gram(s) IV Push once  dextrose 50% Injectable 25 Gram(s) IV Push once  glucagon  Injectable 1 milliGRAM(s) IntraMuscular once  insulin glargine Injectable (LANTUS) 6 Unit(s) SubCutaneous at bedtime  insulin lispro (ADMELOG) corrective regimen sliding scale   SubCutaneous every 6 hours  levothyroxine 112 MICROGram(s) Oral daily  lisinopril 20 milliGRAM(s) Oral daily  metoprolol succinate ER 50 milliGRAM(s) Oral daily  pantoprazole    Tablet 40 milliGRAM(s) Oral before breakfast  piperacillin/tazobactam IVPB.. 3.375 Gram(s) IV Intermittent every 8 hours  polyethylene glycol 3350 17 Gram(s) Oral daily  potassium phosphate IVPB 30 milliMole(s) IV Intermittent once  rosuvastatin 5 milliGRAM(s) Oral at bedtime  senna 2 Tablet(s) Oral at bedtime  sodium chloride 0.9%. 1000 milliLiter(s) (60 mL/Hr) IV Continuous <Continuous>    MEDICATIONS  (PRN):  dextrose Oral Gel 15 Gram(s) Oral once PRN Blood Glucose LESS THAN 70 milliGRAM(s)/deciliter  metoclopramide Injectable 5 milliGRAM(s) IV Push every 8 hours PRN n/v      Vital Signs Last 24 Hrs  T(C): 37.2 (16 Dec 2024 06:11), Max: 37.8 (15 Dec 2024 21:49)  T(F): 98.9 (16 Dec 2024 06:11), Max: 100 (15 Dec 2024 21:49)  HR: 65 (16 Dec 2024 06:11) (65 - 84)  BP: 138/77 (16 Dec 2024 06:11) (114/78 - 138/77)  BP(mean): --  RR: 18 (16 Dec 2024 06:11) (17 - 18)  SpO2: 97% (16 Dec 2024 06:11) (97% - 99%)    Parameters below as of 16 Dec 2024 06:11  Patient On (Oxygen Delivery Method): room air      CAPILLARY BLOOD GLUCOSE      POCT Blood Glucose.: 178 mg/dL (16 Dec 2024 05:49)  POCT Blood Glucose.: 265 mg/dL (15 Dec 2024 23:04)  POCT Blood Glucose.: 197 mg/dL (15 Dec 2024 18:00)  POCT Blood Glucose.: 197 mg/dL (15 Dec 2024 12:35)  POCT Blood Glucose.: 192 mg/dL (15 Dec 2024 08:39)    I&O's Summary      PHYSICAL EXAM:  GENERAL: NAD, well-developed  HEAD:  Atraumatic, Normocephalic  EYES: EOMI, conjunctiva and sclera clear  NECK: Supple, No JVD  CHEST/LUNG: Clear to auscultation bilaterally; No wheeze  HEART: Regular rate and rhythm; No murmurs, rubs, or gallops  ABDOMEN: Soft, Nontender, Nondistended; Bowel sounds present  EXTREMITIES:  2+ Peripheral Pulses, No clubbing, cyanosis, or edema  NEUROLOGY: AOx3, non-focal  SKIN: No rashes or lesions    LABS:                        10.8   7.19  )-----------( 230      ( 16 Dec 2024 03:40 )             34.0     12-16    133[L]  |  102  |  7   ----------------------------<  179[H]  3.6   |  21[L]  |  0.58    Ca    8.3[L]      16 Dec 2024 03:40  Phos  2.2     12-16  Mg     1.90     12-16    TPro  6.5  /  Alb  2.9[L]  /  TBili  2.6[H]  /  DBili  x   /  AST  128[H]  /  ALT  326[H]  /  AlkPhos  179[H]  12-16    PT/INR - ( 16 Dec 2024 03:40 )   PT: 13.1 sec;   INR: 1.10 ratio         PTT - ( 16 Dec 2024 03:40 )  PTT:36.3 sec        MICRO:      RADIOLOGY & ADDITIONAL TESTS:           DATE OF SERVICE: 12-16-24 @ 07:22    Patient is a 64y old  Female who presents with a chief complaint of Abdominal pain (15 Dec 2024 19:03)      SUBJECTIVE / OVERNIGHT EVENTS:  Overnight, no acute events.   Pt seen and examined at bedside. Reports mild abdominal pain.     ROS negative except as above.    MEDICATIONS  (STANDING):  aspirin enteric coated 81 milliGRAM(s) Oral daily  dextrose 5%. 1000 milliLiter(s) (100 mL/Hr) IV Continuous <Continuous>  dextrose 5%. 1000 milliLiter(s) (50 mL/Hr) IV Continuous <Continuous>  dextrose 50% Injectable 25 Gram(s) IV Push once  dextrose 50% Injectable 12.5 Gram(s) IV Push once  dextrose 50% Injectable 25 Gram(s) IV Push once  glucagon  Injectable 1 milliGRAM(s) IntraMuscular once  insulin glargine Injectable (LANTUS) 6 Unit(s) SubCutaneous at bedtime  insulin lispro (ADMELOG) corrective regimen sliding scale   SubCutaneous every 6 hours  levothyroxine 112 MICROGram(s) Oral daily  lisinopril 20 milliGRAM(s) Oral daily  metoprolol succinate ER 50 milliGRAM(s) Oral daily  pantoprazole    Tablet 40 milliGRAM(s) Oral before breakfast  piperacillin/tazobactam IVPB.. 3.375 Gram(s) IV Intermittent every 8 hours  polyethylene glycol 3350 17 Gram(s) Oral daily  potassium phosphate IVPB 30 milliMole(s) IV Intermittent once  rosuvastatin 5 milliGRAM(s) Oral at bedtime  senna 2 Tablet(s) Oral at bedtime  sodium chloride 0.9%. 1000 milliLiter(s) (60 mL/Hr) IV Continuous <Continuous>    MEDICATIONS  (PRN):  dextrose Oral Gel 15 Gram(s) Oral once PRN Blood Glucose LESS THAN 70 milliGRAM(s)/deciliter  metoclopramide Injectable 5 milliGRAM(s) IV Push every 8 hours PRN n/v      Vital Signs Last 24 Hrs  T(C): 37.2 (16 Dec 2024 06:11), Max: 37.8 (15 Dec 2024 21:49)  T(F): 98.9 (16 Dec 2024 06:11), Max: 100 (15 Dec 2024 21:49)  HR: 65 (16 Dec 2024 06:11) (65 - 84)  BP: 138/77 (16 Dec 2024 06:11) (114/78 - 138/77)  BP(mean): --  RR: 18 (16 Dec 2024 06:11) (17 - 18)  SpO2: 97% (16 Dec 2024 06:11) (97% - 99%)    Parameters below as of 16 Dec 2024 06:11  Patient On (Oxygen Delivery Method): room air      CAPILLARY BLOOD GLUCOSE      POCT Blood Glucose.: 178 mg/dL (16 Dec 2024 05:49)  POCT Blood Glucose.: 265 mg/dL (15 Dec 2024 23:04)  POCT Blood Glucose.: 197 mg/dL (15 Dec 2024 18:00)  POCT Blood Glucose.: 197 mg/dL (15 Dec 2024 12:35)  POCT Blood Glucose.: 192 mg/dL (15 Dec 2024 08:39)    I&O's Summary      PHYSICAL EXAM:  GENERAL: NAD, well-developed  HEAD:  Atraumatic, Normocephalic  EYES: EOMI, conjunctiva and sclera clear  NECK: Supple, No JVD  CHEST/LUNG: Clear to auscultation bilaterally; No wheeze  HEART: Regular rate and rhythm; No murmurs, rubs, or gallops  ABDOMEN: Soft, + TTP RUQ, Nondistended; Bowel sounds present  EXTREMITIES:  2+ Peripheral Pulses, No clubbing, cyanosis, or edema  NEUROLOGY: AOx3, non-focal  SKIN: No rashes or lesions    LABS:                        10.8   7.19  )-----------( 230      ( 16 Dec 2024 03:40 )             34.0     12-16    133[L]  |  102  |  7   ----------------------------<  179[H]  3.6   |  21[L]  |  0.58    Ca    8.3[L]      16 Dec 2024 03:40  Phos  2.2     12-16  Mg     1.90     12-16    TPro  6.5  /  Alb  2.9[L]  /  TBili  2.6[H]  /  DBili  x   /  AST  128[H]  /  ALT  326[H]  /  AlkPhos  179[H]  12-16    PT/INR - ( 16 Dec 2024 03:40 )   PT: 13.1 sec;   INR: 1.10 ratio         PTT - ( 16 Dec 2024 03:40 )  PTT:36.3 sec        MICRO:      RADIOLOGY & ADDITIONAL TESTS:

## 2024-12-17 ENCOUNTER — TRANSCRIPTION ENCOUNTER (OUTPATIENT)
Age: 64
End: 2024-12-17

## 2024-12-17 LAB
ALBUMIN SERPL ELPH-MCNC: 3.2 G/DL — LOW (ref 3.3–5)
ALP SERPL-CCNC: 220 U/L — HIGH (ref 40–120)
ALT FLD-CCNC: 234 U/L — HIGH (ref 4–33)
ANION GAP SERPL CALC-SCNC: 14 MMOL/L — SIGNIFICANT CHANGE UP (ref 7–14)
APTT BLD: 34.6 SEC — SIGNIFICANT CHANGE UP (ref 24.5–35.6)
AST SERPL-CCNC: 67 U/L — HIGH (ref 4–32)
BILIRUB SERPL-MCNC: 1.3 MG/DL — HIGH (ref 0.2–1.2)
BUN SERPL-MCNC: 7 MG/DL — SIGNIFICANT CHANGE UP (ref 7–23)
CALCIUM SERPL-MCNC: 9.5 MG/DL — SIGNIFICANT CHANGE UP (ref 8.4–10.5)
CHLORIDE SERPL-SCNC: 104 MMOL/L — SIGNIFICANT CHANGE UP (ref 98–107)
CO2 SERPL-SCNC: 24 MMOL/L — SIGNIFICANT CHANGE UP (ref 22–31)
CREAT SERPL-MCNC: 0.63 MG/DL — SIGNIFICANT CHANGE UP (ref 0.5–1.3)
EGFR: 98 ML/MIN/1.73M2 — SIGNIFICANT CHANGE UP
GLUCOSE BLDC GLUCOMTR-MCNC: 117 MG/DL — HIGH (ref 70–99)
GLUCOSE BLDC GLUCOMTR-MCNC: 141 MG/DL — HIGH (ref 70–99)
GLUCOSE BLDC GLUCOMTR-MCNC: 204 MG/DL — HIGH (ref 70–99)
GLUCOSE BLDC GLUCOMTR-MCNC: 206 MG/DL — HIGH (ref 70–99)
GLUCOSE BLDC GLUCOMTR-MCNC: 208 MG/DL — HIGH (ref 70–99)
GLUCOSE BLDC GLUCOMTR-MCNC: 232 MG/DL — HIGH (ref 70–99)
GLUCOSE SERPL-MCNC: 189 MG/DL — HIGH (ref 70–99)
HCT VFR BLD CALC: 36.5 % — SIGNIFICANT CHANGE UP (ref 34.5–45)
HGB BLD-MCNC: 11.2 G/DL — LOW (ref 11.5–15.5)
INR BLD: 1.03 RATIO — SIGNIFICANT CHANGE UP (ref 0.85–1.16)
MAGNESIUM SERPL-MCNC: 2 MG/DL — SIGNIFICANT CHANGE UP (ref 1.6–2.6)
MCHC RBC-ENTMCNC: 26 PG — LOW (ref 27–34)
MCHC RBC-ENTMCNC: 30.7 G/DL — LOW (ref 32–36)
MCV RBC AUTO: 84.9 FL — SIGNIFICANT CHANGE UP (ref 80–100)
NRBC # BLD: 0 /100 WBCS — SIGNIFICANT CHANGE UP (ref 0–0)
NRBC # FLD: 0 K/UL — SIGNIFICANT CHANGE UP (ref 0–0)
PHOSPHATE SERPL-MCNC: 3.6 MG/DL — SIGNIFICANT CHANGE UP (ref 2.5–4.5)
PLATELET # BLD AUTO: 269 K/UL — SIGNIFICANT CHANGE UP (ref 150–400)
POTASSIUM SERPL-MCNC: 4.2 MMOL/L — SIGNIFICANT CHANGE UP (ref 3.5–5.3)
POTASSIUM SERPL-SCNC: 4.2 MMOL/L — SIGNIFICANT CHANGE UP (ref 3.5–5.3)
PROT SERPL-MCNC: 7.1 G/DL — SIGNIFICANT CHANGE UP (ref 6–8.3)
PROTHROM AB SERPL-ACNC: 11.9 SEC — SIGNIFICANT CHANGE UP (ref 9.9–13.4)
RBC # BLD: 4.3 M/UL — SIGNIFICANT CHANGE UP (ref 3.8–5.2)
RBC # FLD: 14.7 % — HIGH (ref 10.3–14.5)
SODIUM SERPL-SCNC: 142 MMOL/L — SIGNIFICANT CHANGE UP (ref 135–145)
WBC # BLD: 6.89 K/UL — SIGNIFICANT CHANGE UP (ref 3.8–10.5)
WBC # FLD AUTO: 6.89 K/UL — SIGNIFICANT CHANGE UP (ref 3.8–10.5)

## 2024-12-17 PROCEDURE — 88304 TISSUE EXAM BY PATHOLOGIST: CPT | Mod: 26

## 2024-12-17 PROCEDURE — 99232 SBSQ HOSP IP/OBS MODERATE 35: CPT | Mod: GC

## 2024-12-17 PROCEDURE — 99233 SBSQ HOSP IP/OBS HIGH 50: CPT | Mod: GC

## 2024-12-17 PROCEDURE — 47563 LAPARO CHOLECYSTECTOMY/GRAPH: CPT

## 2024-12-17 PROCEDURE — S2900 ROBOTIC SURGICAL SYSTEM: CPT | Mod: NC

## 2024-12-17 DEVICE — XI STAPLER SUREFORM RELOAD 30 WHITE: Type: IMPLANTABLE DEVICE | Status: FUNCTIONAL

## 2024-12-17 DEVICE — VISTASEAL FIBRIN HUMAN 10ML: Type: IMPLANTABLE DEVICE | Status: FUNCTIONAL

## 2024-12-17 DEVICE — LIGATING CLIPS WECK HEMOLOK POLYMER LARGE (PURPLE) 6: Type: IMPLANTABLE DEVICE | Status: FUNCTIONAL

## 2024-12-17 DEVICE — IMPLANTABLE DEVICE: Type: IMPLANTABLE DEVICE | Status: FUNCTIONAL

## 2024-12-17 RX ADMIN — METOPROLOL TARTRATE 50 MILLIGRAM(S): 100 TABLET, FILM COATED ORAL at 05:38

## 2024-12-17 RX ADMIN — ROSUVASTATIN CALCIUM 5 MILLIGRAM(S): 5 TABLET, FILM COATED ORAL at 22:30

## 2024-12-17 RX ADMIN — Medication 112 MICROGRAM(S): at 05:38

## 2024-12-17 RX ADMIN — PIPERACILLIN SODIUM AND TAZOBACTAM SODIUM 25 GRAM(S): 4; .5 INJECTION, POWDER, LYOPHILIZED, FOR SOLUTION INTRAVENOUS at 05:37

## 2024-12-17 RX ADMIN — PANTOPRAZOLE SODIUM 40 MILLIGRAM(S): 40 TABLET, DELAYED RELEASE ORAL at 07:29

## 2024-12-17 RX ADMIN — Medication 1 APPLICATION(S): at 05:39

## 2024-12-17 RX ADMIN — CHLORHEXIDINE GLUCONATE 1 APPLICATION(S): 1.2 RINSE ORAL at 05:43

## 2024-12-17 RX ADMIN — LISINOPRIL 20 MILLIGRAM(S): 20 TABLET ORAL at 05:38

## 2024-12-17 RX ADMIN — INSULIN GLARGINE 6 UNIT(S): 100 INJECTION, SOLUTION SUBCUTANEOUS at 23:56

## 2024-12-17 RX ADMIN — Medication 2 TABLET(S): at 22:30

## 2024-12-17 NOTE — PROGRESS NOTE ADULT - ASSESSMENT
Vero Soto is a 63 yo F with a PMHx of DM2 on insulin, HTN, HLD, hypothyroidism presenting with acute RUQ pain associated with nausea and vomiting. Physical exam is notable for RUQ tenderness to palpation, and labs are notable for leukocytosis of 13.5, and + glucose and ketones in urine. DDx includes gastritis/gastroenteritis vs. choledocholithiasis

## 2024-12-17 NOTE — PRE-OP CHECKLIST - SELECT TESTS ORDERED
117/POCT Blood Glucose 117/BMP/CBC/PT/PTT/Type and Cross/Type and Screen/POCT Blood Glucose 117/BMP/CBC/PT/PTT/INR/Type and Cross/Type and Screen/POCT Blood Glucose

## 2024-12-17 NOTE — PROGRESS NOTE ADULT - PROBLEM SELECTOR PLAN 1
RUQ pain associated with N/V. + leukocytosis of 13.49. US in ED revealed cholelithaisis w/o evidence of acute cholecystitis and dilated common bile duct  Troponin: <6  MRCP - unequivocal for acute keo, PE much improved, tolerating diet     Plan  - NPO for today's procedure   - GI, appreciate recs   - EUS/ERCP plan for Monday 12/16  - Zosyn for now, c/f ascending cholangitis   - Surg recommends interval lap keo this admit  - Trend liver enzymes RUQ pain associated with N/V. + leukocytosis of 13.49. US in ED revealed cholelithaisis w/o evidence of acute cholecystitis and dilated common bile duct  Troponin: <6  MRCP - unequivocal for acute keo, PE much improved, tolerating diet   EUS w/ evidence of CBD dilation, evidence of choledocholithiasis     Plan  - Per surg, plan for CCY today   - NPO   - Zosyn for now, c/f ascending cholangitis   - Trend liver enzymes Spontaneous, unlabored and symmetrical

## 2024-12-17 NOTE — PROGRESS NOTE ADULT - SUBJECTIVE AND OBJECTIVE BOX
Gastroenterology Progress Note    Interval Events:   -s/p EUS yesterday   -Denies any ongoing fevers, chills, nausea, vomiting or abd pain   -Plan for CCY today or tomorrow with surgery team     Allergies:  Allergy Status Unknown      Hospital Medications:  aspirin enteric coated 81 milliGRAM(s) Oral daily  dextrose 5%. 1000 milliLiter(s) IV Continuous <Continuous>  dextrose 5%. 1000 milliLiter(s) IV Continuous <Continuous>  dextrose 50% Injectable 25 Gram(s) IV Push once  dextrose 50% Injectable 12.5 Gram(s) IV Push once  dextrose 50% Injectable 25 Gram(s) IV Push once  dextrose Oral Gel 15 Gram(s) Oral once PRN  glucagon  Injectable 1 milliGRAM(s) IntraMuscular once  insulin glargine Injectable (LANTUS) 6 Unit(s) SubCutaneous at bedtime  insulin lispro (ADMELOG) corrective regimen sliding scale   SubCutaneous every 6 hours  levothyroxine 112 MICROGram(s) Oral daily  metoclopramide Injectable 5 milliGRAM(s) IV Push every 8 hours PRN  metoprolol succinate ER 50 milliGRAM(s) Oral daily  mupirocin 2% Ointment 1 Application(s) Both Nostrils two times a day  pantoprazole    Tablet 40 milliGRAM(s) Oral before breakfast  piperacillin/tazobactam IVPB.. 3.375 Gram(s) IV Intermittent every 8 hours  polyethylene glycol 3350 17 Gram(s) Oral daily  rosuvastatin 5 milliGRAM(s) Oral at bedtime  senna 2 Tablet(s) Oral at bedtime  sodium chloride 0.9%. 1000 milliLiter(s) IV Continuous <Continuous>      ROS: 14 point ROS negative unless otherwise state in subjective    PHYSICAL EXAM:   Vital Signs:  Vital Signs Last 24 Hrs  T(C): 36.9 (17 Dec 2024 05:07), Max: 36.9 (17 Dec 2024 05:07)  T(F): 98.4 (17 Dec 2024 05:07), Max: 98.4 (17 Dec 2024 05:07)  HR: 61 (17 Dec 2024 05:07) (61 - 74)  BP: 140/72 (17 Dec 2024 05:07) (113/58 - 146/87)  BP(mean): --  RR: 18 (17 Dec 2024 05:07) (16 - 21)  SpO2: 97% (17 Dec 2024 05:07) (96% - 100%)    Parameters below as of 17 Dec 2024 05:07  Patient On (Oxygen Delivery Method): room air      GENERAL:  No acute distress  HEENT:  NCAT, no scleral icterus  CHEST: no resp distress  HEART:  RRR  ABDOMEN:  Soft, non-tender, non-distended, normoactive bowel sounds  NEURO:  Alert and oriented x 3    LABS:                        11.2   6.89  )-----------( 269      ( 17 Dec 2024 05:10 )             36.5     Mean Cell Volume: 84.9 fL (12-17-24 @ 05:10)    12-17    142  |  104  |  7   ----------------------------<  189[H]  4.2   |  24  |  0.63    Ca    9.5      17 Dec 2024 05:10  Phos  3.6     12-17  Mg     2.00     12-17    TPro  7.1  /  Alb  3.2[L]  /  TBili  1.3[H]  /  DBili  x   /  AST  67[H]  /  ALT  234[H]  /  AlkPhos  220[H]  12-17    LIVER FUNCTIONS - ( 17 Dec 2024 05:10 )  Alb: 3.2 g/dL / Pro: 7.1 g/dL / ALK PHOS: 220 U/L / ALT: 234 U/L / AST: 67 U/L / GGT: x           PT/INR - ( 17 Dec 2024 07:14 )   PT: 11.9 sec;   INR: 1.03 ratio         PTT - ( 17 Dec 2024 07:14 )  PTT:34.6 sec  Urinalysis Basic - ( 17 Dec 2024 05:10 )    Color: x / Appearance: x / SG: x / pH: x  Gluc: 189 mg/dL / Ketone: x  / Bili: x / Urobili: x   Blood: x / Protein: x / Nitrite: x   Leuk Esterase: x / RBC: x / WBC x   Sq Epi: x / Non Sq Epi: x / Bacteria: x        Imaging:  < from: Upper EUS (12.16.24 @ 15:16) >  Impression:          - Patient without any choledocholithiasis. It is highly                        likely that patienthad a choledocholithiasis which has                        passed.                       - There was dilation in the common bile duct which                        measured up to 7 mm. No stones seen.                       - Borderline GB wall thickening. Cholelithiasis seen at                        neck of the gallbladder.                       - There was no sign of significant pathology in the                        ampulla.                       - There was no sign of significant pathology inthe                        pancreatic head, genu of the pancreas, pancreatic body,                        pancreatic tail, uncinate process of the pancreas,                        pancreatic neck and main pancreatic duct.                       - There was no evidence of significant pathology in the                        entire examined liver.                       - A few reactive, enlarged lymph nodes were visualized                        in the [place] hepatis region.  Recommendation:      - Post diagnostic EUS in PACU as per protocol.                       - Continue LR in recovery room.                       - Resume diet as tolerated.                       - Resume home medications..                       - Timing of cholecystectomy per surgery team                       - Further management as per admitting team.    < end of copied text >

## 2024-12-17 NOTE — PROGRESS NOTE ADULT - ASSESSMENT
63 yo F w/ PMHx of T2DM, HTN, HLD, hypothyroidism admitted w/ acute cholecystitis, dilated CBD and likely passed stone.     Recommendations:   - S/p EUS 12/16. No choledocho, CBD 7mm. No significant pancreatic pathology   - Plan for OR tomorrow, Wed 12/17 for lap keo, poss robotic-assisted, poss open     - Consent to be obtained     - Pre-op: NPO@MN, 4AM labs (CBC, BMP, Mg, Phos, T&S, coags)   - Patient remains afebrile on IV abx. Blood cx 12/14 NGTD   - Will follow      B Team Surgery   [id number]   65 yo F w/ PMHx of T2DM, HTN, HLD, hypothyroidism admitted w/ acute cholecystitis, dilated CBD and likely passed stone.     Recommendations:   - S/p EUS 12/16. No choledocho, CBD 7mm. No significant pancreatic pathology   - Plan for OR today, 12/17 vs tomorrow, Wed 12/18 for lap keo, poss robotic-assisted, poss open     - Consent to be obtained     - Pre-op: keep NPO, AM labs CBC, BMP, Mg, Phos, coags. Active T&S from 12/16   - Patient remains afebrile on IV abx. Blood cx 12/14 NGTD   - Will follow      B Team Surgery   [id number]   63 yo F w/ PMHx of T2DM, HTN, HLD, hypothyroidism admitted w/ acute cholecystitis, dilated CBD and likely passed stone.     Recommendations:   - S/p EUS 12/16. No choledocho, CBD 7mm. No significant pancreatic pathology   - Plan for OR today, 12/17 vs tomorrow, Wed 12/18 for lap keo, poss robotic-assisted, poss open     - Consent obtained, located in chart      - Pre-op: keep NPO, AM labs CBC, BMP, Mg, Phos, coags. Active T&S from 12/16          - Had clear liquids at 5AM, otherwise NPO since MN   - Patient remains afebrile on IV abx. Blood cx 12/14 NGTD   - Will follow      B Team Surgery   [id number]

## 2024-12-17 NOTE — PROGRESS NOTE ADULT - SUBJECTIVE AND OBJECTIVE BOX
ANESTHESIA POSTOP CHECK    64y Female POSTOP DAY 1     Vital Signs Last 24 Hrs  T(C): 36.9 (17 Dec 2024 05:07), Max: 36.9 (17 Dec 2024 05:07)  T(F): 98.4 (17 Dec 2024 05:07), Max: 98.4 (17 Dec 2024 05:07)  HR: 61 (17 Dec 2024 05:07) (61 - 74)  BP: 140/72 (17 Dec 2024 05:07) (113/58 - 146/87)  BP(mean): --  RR: 18 (17 Dec 2024 05:07) (16 - 21)  SpO2: 97% (17 Dec 2024 05:07) (96% - 100%)    Parameters below as of 17 Dec 2024 05:07  Patient On (Oxygen Delivery Method): room air      I&O's Summary      [X ] NO APPARENT ANESTHESIA COMPLICATIONS      Comments:

## 2024-12-17 NOTE — PROGRESS NOTE ADULT - SUBJECTIVE AND OBJECTIVE BOX
DATE OF SERVICE: 12-17-24 @ 07:21    Patient is a 64y old  Female who presents with a chief complaint of Abdominal pain (17 Dec 2024 05:27)      SUBJECTIVE / OVERNIGHT EVENTS:  Overnight,  Pt seen and examined at bedside.    ROS negative except as above.    MEDICATIONS  (STANDING):  aspirin enteric coated 81 milliGRAM(s) Oral daily  dextrose 5%. 1000 milliLiter(s) (100 mL/Hr) IV Continuous <Continuous>  dextrose 5%. 1000 milliLiter(s) (50 mL/Hr) IV Continuous <Continuous>  dextrose 50% Injectable 25 Gram(s) IV Push once  dextrose 50% Injectable 12.5 Gram(s) IV Push once  dextrose 50% Injectable 25 Gram(s) IV Push once  glucagon  Injectable 1 milliGRAM(s) IntraMuscular once  insulin glargine Injectable (LANTUS) 6 Unit(s) SubCutaneous at bedtime  insulin lispro (ADMELOG) corrective regimen sliding scale   SubCutaneous Before meals and at bedtime  levothyroxine 112 MICROGram(s) Oral daily  metoprolol succinate ER 50 milliGRAM(s) Oral daily  mupirocin 2% Ointment 1 Application(s) Both Nostrils two times a day  pantoprazole    Tablet 40 milliGRAM(s) Oral before breakfast  piperacillin/tazobactam IVPB.. 3.375 Gram(s) IV Intermittent every 8 hours  polyethylene glycol 3350 17 Gram(s) Oral daily  rosuvastatin 5 milliGRAM(s) Oral at bedtime  senna 2 Tablet(s) Oral at bedtime  sodium chloride 0.9%. 1000 milliLiter(s) (60 mL/Hr) IV Continuous <Continuous>    MEDICATIONS  (PRN):  dextrose Oral Gel 15 Gram(s) Oral once PRN Blood Glucose LESS THAN 70 milliGRAM(s)/deciliter  metoclopramide Injectable 5 milliGRAM(s) IV Push every 8 hours PRN n/v      Vital Signs Last 24 Hrs  T(C): 36.9 (17 Dec 2024 05:07), Max: 36.9 (17 Dec 2024 05:07)  T(F): 98.4 (17 Dec 2024 05:07), Max: 98.4 (17 Dec 2024 05:07)  HR: 61 (17 Dec 2024 05:07) (61 - 74)  BP: 140/72 (17 Dec 2024 05:07) (113/58 - 146/87)  BP(mean): --  RR: 18 (17 Dec 2024 05:07) (16 - 21)  SpO2: 97% (17 Dec 2024 05:07) (96% - 100%)    Parameters below as of 17 Dec 2024 05:07  Patient On (Oxygen Delivery Method): room air      CAPILLARY BLOOD GLUCOSE      POCT Blood Glucose.: 197 mg/dL (16 Dec 2024 22:18)  POCT Blood Glucose.: 167 mg/dL (16 Dec 2024 18:00)  POCT Blood Glucose.: 97 mg/dL (16 Dec 2024 16:22)  POCT Blood Glucose.: 108 mg/dL (16 Dec 2024 13:42)  POCT Blood Glucose.: 121 mg/dL (16 Dec 2024 12:39)    I&O's Summary      PHYSICAL EXAM:  GENERAL: NAD, well-developed  HEAD:  Atraumatic, Normocephalic  EYES: EOMI, conjunctiva and sclera clear  NECK: Supple, No JVD  CHEST/LUNG: Clear to auscultation bilaterally; No wheeze  HEART: Regular rate and rhythm; No murmurs, rubs, or gallops  ABDOMEN: Soft, Nontender, Nondistended; Bowel sounds present  EXTREMITIES:  2+ Peripheral Pulses, No clubbing, cyanosis, or edema  NEUROLOGY: AOx3, non-focal  SKIN: No rashes or lesions    LABS:                        11.2   6.89  )-----------( 269      ( 17 Dec 2024 05:10 )             36.5     12-17    142  |  104  |  7   ----------------------------<  189[H]  4.2   |  24  |  0.63    Ca    9.5      17 Dec 2024 05:10  Phos  3.6     12-17  Mg     2.00     12-17    TPro  7.1  /  Alb  3.2[L]  /  TBili  1.3[H]  /  DBili  x   /  AST  67[H]  /  ALT  234[H]  /  AlkPhos  220[H]  12-17    PT/INR - ( 16 Dec 2024 03:40 )   PT: 13.1 sec;   INR: 1.10 ratio         PTT - ( 16 Dec 2024 03:40 )  PTT:36.3 sec        MICRO:      RADIOLOGY & ADDITIONAL TESTS:           DATE OF SERVICE: 12-17-24 @ 07:21    Patient is a 64y old  Female who presents with a chief complaint of Abdominal pain (17 Dec 2024 05:27)      SUBJECTIVE / OVERNIGHT EVENTS:  Overnight, no acute events.   Pt seen and examined at bedside. Pt well this AM. Reports improving RUQ pain.    ROS negative except as above.    MEDICATIONS  (STANDING):  aspirin enteric coated 81 milliGRAM(s) Oral daily  dextrose 5%. 1000 milliLiter(s) (100 mL/Hr) IV Continuous <Continuous>  dextrose 5%. 1000 milliLiter(s) (50 mL/Hr) IV Continuous <Continuous>  dextrose 50% Injectable 25 Gram(s) IV Push once  dextrose 50% Injectable 12.5 Gram(s) IV Push once  dextrose 50% Injectable 25 Gram(s) IV Push once  glucagon  Injectable 1 milliGRAM(s) IntraMuscular once  insulin glargine Injectable (LANTUS) 6 Unit(s) SubCutaneous at bedtime  insulin lispro (ADMELOG) corrective regimen sliding scale   SubCutaneous Before meals and at bedtime  levothyroxine 112 MICROGram(s) Oral daily  metoprolol succinate ER 50 milliGRAM(s) Oral daily  mupirocin 2% Ointment 1 Application(s) Both Nostrils two times a day  pantoprazole    Tablet 40 milliGRAM(s) Oral before breakfast  piperacillin/tazobactam IVPB.. 3.375 Gram(s) IV Intermittent every 8 hours  polyethylene glycol 3350 17 Gram(s) Oral daily  rosuvastatin 5 milliGRAM(s) Oral at bedtime  senna 2 Tablet(s) Oral at bedtime  sodium chloride 0.9%. 1000 milliLiter(s) (60 mL/Hr) IV Continuous <Continuous>    MEDICATIONS  (PRN):  dextrose Oral Gel 15 Gram(s) Oral once PRN Blood Glucose LESS THAN 70 milliGRAM(s)/deciliter  metoclopramide Injectable 5 milliGRAM(s) IV Push every 8 hours PRN n/v      Vital Signs Last 24 Hrs  T(C): 36.9 (17 Dec 2024 05:07), Max: 36.9 (17 Dec 2024 05:07)  T(F): 98.4 (17 Dec 2024 05:07), Max: 98.4 (17 Dec 2024 05:07)  HR: 61 (17 Dec 2024 05:07) (61 - 74)  BP: 140/72 (17 Dec 2024 05:07) (113/58 - 146/87)  BP(mean): --  RR: 18 (17 Dec 2024 05:07) (16 - 21)  SpO2: 97% (17 Dec 2024 05:07) (96% - 100%)    Parameters below as of 17 Dec 2024 05:07  Patient On (Oxygen Delivery Method): room air      CAPILLARY BLOOD GLUCOSE      POCT Blood Glucose.: 197 mg/dL (16 Dec 2024 22:18)  POCT Blood Glucose.: 167 mg/dL (16 Dec 2024 18:00)  POCT Blood Glucose.: 97 mg/dL (16 Dec 2024 16:22)  POCT Blood Glucose.: 108 mg/dL (16 Dec 2024 13:42)  POCT Blood Glucose.: 121 mg/dL (16 Dec 2024 12:39)    I&O's Summary      PHYSICAL EXAM:  GENERAL: NAD, well-developed  HEAD:  Atraumatic, Normocephalic  EYES: EOMI, conjunctiva and sclera clear  NECK: Supple, No JVD  CHEST/LUNG: Clear to auscultation bilaterally; No wheeze  HEART: Regular rate and rhythm; No murmurs, rubs, or gallops  ABDOMEN: Soft, + TTP RUQ, Nondistended; Bowel sounds present  EXTREMITIES:  2+ Peripheral Pulses, No clubbing, cyanosis, or edema  NEUROLOGY: AOx3, non-focal  SKIN: No rashes or lesions    LABS:                        11.2   6.89  )-----------( 269      ( 17 Dec 2024 05:10 )             36.5     12-17    142  |  104  |  7   ----------------------------<  189[H]  4.2   |  24  |  0.63    Ca    9.5      17 Dec 2024 05:10  Phos  3.6     12-17  Mg     2.00     12-17    TPro  7.1  /  Alb  3.2[L]  /  TBili  1.3[H]  /  DBili  x   /  AST  67[H]  /  ALT  234[H]  /  AlkPhos  220[H]  12-17    PT/INR - ( 16 Dec 2024 03:40 )   PT: 13.1 sec;   INR: 1.10 ratio         PTT - ( 16 Dec 2024 03:40 )  PTT:36.3 sec        MICRO:      RADIOLOGY & ADDITIONAL TESTS:

## 2024-12-17 NOTE — PROGRESS NOTE ADULT - ASSESSMENT
64F with NIDDM, GERD, hypertension, hyperlipidemia, presents with RUQ/epigastric abdominal pain associated with nausea/vomiting with elevated LFTS    #Acute febrile illness  #Rising T Bili / liver enzymes  #RUQ/epigastric pain  #nausea/vomiting  Patient presenting with RUQ/epigastric pain associated with nausea/vomiting following dinner (duck). Hemodynamically stable. Labs with initial WBC 13k; LFTs normal. RUQ US with Cholelithiasis, mild distention, negative sonographic pal.Pt had MRCP showing diffuse dilation fo CBD without evidence choledocholithiasis or pancreatic head mass. Also showing mild intrahepatic biliary dilatation. Given continued fevers and elevated LFTs she underwent an EUS on 12/16/2024 with no choledocholithiasis although highly likely that patienthad a choledocholithiasis which has passed.There was dilation in the common bile duct which  measured up to 7 mm. No stones seen.Borderline GB wall thickening. Cholelithiasis seen at neck of the gallbladder. There was no sign of significant pathology in the ampulla. There was no sign of significant pathology inthepancreatic head, genu of the pancreas, pancreatic body,  pancreatic tail, uncinate process of the pancreas, pancreatic neck and main pancreatic duct. Pt with no fever and down trending LFTs. Suspect etiology of the above likely 2/2 to passed gallstone. Timing of CCY per surgery. No further GI interventions planned at this time,                            Recommendations:  -Diet as tolerated from GI perspective   -Timing of CCY per surgery team   -Trend LFTs   -No further GI interventions planned at this time     GI will plan to sign off at this time. Please feel free to reach out to our team with any follow up questions.  All recommendations are tentative until note is attested by attending.

## 2024-12-17 NOTE — BRIEF OPERATIVE NOTE - OPERATION/FINDINGS
4 port robotic cholecystectomy. Artery and duct identified, artery ligated with hemolock. Cystic duct stapled across w/ 8mm stapler.

## 2024-12-17 NOTE — PROGRESS NOTE ADULT - SUBJECTIVE AND OBJECTIVE BOX
B TEAM SURGERY DAILY PROGRESS NOTE    SUBJECTIVE:   ***patient to be seen***     OBJECTIVE:  Vital Signs Last 24 Hrs  T(C): 36.9 (17 Dec 2024 05:07), Max: 37.2 (16 Dec 2024 06:11)  T(F): 98.4 (17 Dec 2024 05:07), Max: 98.9 (16 Dec 2024 06:11)  HR: 61 (17 Dec 2024 05:07) (61 - 74)  BP: 140/72 (17 Dec 2024 05:07) (113/58 - 146/87)  RR: 18 (17 Dec 2024 05:07) (16 - 21)  SpO2: 97% (17 Dec 2024 05:07) (96% - 100%)    Parameters below as of 17 Dec 2024 05:07  Patient On (Oxygen Delivery Method): room air      STANDING  aspirin enteric coated 81 milliGRAM(s) Oral daily  chlorhexidine 2% Cloths 1 Application(s) Topical every 12 hours  dextrose 5%. 1000 milliLiter(s) (100 mL/Hr) IV Continuous <Continuous>  dextrose 5%. 1000 milliLiter(s) (50 mL/Hr) IV Continuous <Continuous>  dextrose 50% Injectable 25 Gram(s) IV Push once  dextrose 50% Injectable 12.5 Gram(s) IV Push once  dextrose 50% Injectable 25 Gram(s) IV Push once  glucagon  Injectable 1 milliGRAM(s) IntraMuscular once  insulin glargine Injectable (LANTUS) 6 Unit(s) SubCutaneous at bedtime  insulin lispro (ADMELOG) corrective regimen sliding scale   SubCutaneous Before meals and at bedtime  levothyroxine 112 MICROGram(s) Oral daily  lisinopril 20 milliGRAM(s) Oral daily  metoprolol succinate ER 50 milliGRAM(s) Oral daily  mupirocin 2% Ointment 1 Application(s) Both Nostrils two times a day  pantoprazole    Tablet 40 milliGRAM(s) Oral before breakfast  piperacillin/tazobactam IVPB.. 3.375 Gram(s) IV Intermittent every 8 hours  polyethylene glycol 3350 17 Gram(s) Oral daily  rosuvastatin 5 milliGRAM(s) Oral at bedtime  senna 2 Tablet(s) Oral at bedtime  sodium chloride 0.9%. 1000 milliLiter(s) (60 mL/Hr) IV Continuous <Continuous>    PRN  dextrose Oral Gel 15 Gram(s) Oral once PRN Blood Glucose LESS THAN 70 milliGRAM(s)/deciliter  metoclopramide Injectable 5 milliGRAM(s) IV Push every 8 hours PRN n/v      Labs:  133[L]  |  102  |  7  ----------------------------<  179[H]    (12-16)  3.6   |  21[L]  |  0.58          Ca    8.3[L]      12-16  Mg    1.90  Phos  2.2[L]                Physical Exam:  ***incomplete pending evaluation  Patient without any choledocholithiasis. It is highly                        likely that patient had a choledocholithiasis which has                        passed.                       - There was dilation in the common bile duct which                        measured up to 7 mm. No stones seen.                       - Borderline GB wall thickening. Cholelithiasis seen at                        neck of the gallbladder.                       - There was no sign of significant pathology in the                        ampulla.                       - There was no sign of significant pathology in the                        pancreatic head, genu of the pancreas, pancreatic body,                        pancreatic tail, uncinate process of the pancreas,                        pancreatic neck and main pancreatic duct.                       - There was no evidence of significant pathology in the                        entire examined liver.                       - A few reactive, enlarged lymph nodes were visualized                        in the [place] hepatis region. B TEAM SURGERY DAILY PROGRESS NOTE    SUBJECTIVE:   Patient seen and evaluated. Feeling well. Denies abdominal pain       OBJECTIVE:  Vital Signs Last 24 Hrs  T(C): 36.9 (17 Dec 2024 05:07), Max: 37.2 (16 Dec 2024 06:11)  T(F): 98.4 (17 Dec 2024 05:07), Max: 98.9 (16 Dec 2024 06:11)  HR: 61 (17 Dec 2024 05:07) (61 - 74)  BP: 140/72 (17 Dec 2024 05:07) (113/58 - 146/87)  RR: 18 (17 Dec 2024 05:07) (16 - 21)  SpO2: 97% (17 Dec 2024 05:07) (96% - 100%)    Parameters below as of 17 Dec 2024 05:07  Patient On (Oxygen Delivery Method): room air      STANDING  aspirin enteric coated 81 milliGRAM(s) Oral daily  chlorhexidine 2% Cloths 1 Application(s) Topical every 12 hours  dextrose 5%. 1000 milliLiter(s) (100 mL/Hr) IV Continuous <Continuous>  dextrose 5%. 1000 milliLiter(s) (50 mL/Hr) IV Continuous <Continuous>  dextrose 50% Injectable 25 Gram(s) IV Push once  dextrose 50% Injectable 12.5 Gram(s) IV Push once  dextrose 50% Injectable 25 Gram(s) IV Push once  glucagon  Injectable 1 milliGRAM(s) IntraMuscular once  insulin glargine Injectable (LANTUS) 6 Unit(s) SubCutaneous at bedtime  insulin lispro (ADMELOG) corrective regimen sliding scale   SubCutaneous Before meals and at bedtime  levothyroxine 112 MICROGram(s) Oral daily  lisinopril 20 milliGRAM(s) Oral daily  metoprolol succinate ER 50 milliGRAM(s) Oral daily  mupirocin 2% Ointment 1 Application(s) Both Nostrils two times a day  pantoprazole    Tablet 40 milliGRAM(s) Oral before breakfast  piperacillin/tazobactam IVPB.. 3.375 Gram(s) IV Intermittent every 8 hours  polyethylene glycol 3350 17 Gram(s) Oral daily  rosuvastatin 5 milliGRAM(s) Oral at bedtime  senna 2 Tablet(s) Oral at bedtime  sodium chloride 0.9%. 1000 milliLiter(s) (60 mL/Hr) IV Continuous <Continuous>    PRN  dextrose Oral Gel 15 Gram(s) Oral once PRN Blood Glucose LESS THAN 70 milliGRAM(s)/deciliter  metoclopramide Injectable 5 milliGRAM(s) IV Push every 8 hours PRN n/v      Labs:  133[L]  |  102  |  7  ----------------------------<  179[H]    (12-16)  3.6   |  21[L]  |  0.58          Ca    8.3[L]      12-16  Mg    1.90  Phos  2.2[L]              Physical Exam:  General: NAD  Cardiovascular: appears well perfused   Respiratory: respirations non labored  Gastrointestinal: soft, nontender, nondistended  Extremities: FROM, warm  Neurological: A+Ox3

## 2024-12-18 VITALS
DIASTOLIC BLOOD PRESSURE: 59 MMHG | HEART RATE: 65 BPM | TEMPERATURE: 99 F | SYSTOLIC BLOOD PRESSURE: 132 MMHG | OXYGEN SATURATION: 99 % | RESPIRATION RATE: 18 BRPM

## 2024-12-18 DIAGNOSIS — K80.42 CALCULUS OF BILE DUCT WITH ACUTE CHOLECYSTITIS WITHOUT OBSTRUCTION: ICD-10-CM

## 2024-12-18 LAB
ALBUMIN SERPL ELPH-MCNC: 3.4 G/DL — SIGNIFICANT CHANGE UP (ref 3.3–5)
ALP SERPL-CCNC: 220 U/L — HIGH (ref 40–120)
ALT FLD-CCNC: 197 U/L — HIGH (ref 4–33)
ANION GAP SERPL CALC-SCNC: 13 MMOL/L — SIGNIFICANT CHANGE UP (ref 7–14)
AST SERPL-CCNC: 71 U/L — HIGH (ref 4–32)
BILIRUB SERPL-MCNC: 1 MG/DL — SIGNIFICANT CHANGE UP (ref 0.2–1.2)
BUN SERPL-MCNC: 12 MG/DL — SIGNIFICANT CHANGE UP (ref 7–23)
CALCIUM SERPL-MCNC: 9.4 MG/DL — SIGNIFICANT CHANGE UP (ref 8.4–10.5)
CHLORIDE SERPL-SCNC: 99 MMOL/L — SIGNIFICANT CHANGE UP (ref 98–107)
CO2 SERPL-SCNC: 23 MMOL/L — SIGNIFICANT CHANGE UP (ref 22–31)
CREAT SERPL-MCNC: 0.59 MG/DL — SIGNIFICANT CHANGE UP (ref 0.5–1.3)
EGFR: 100 ML/MIN/1.73M2 — SIGNIFICANT CHANGE UP
GLUCOSE BLDC GLUCOMTR-MCNC: 218 MG/DL — HIGH (ref 70–99)
GLUCOSE BLDC GLUCOMTR-MCNC: 246 MG/DL — HIGH (ref 70–99)
GLUCOSE SERPL-MCNC: 263 MG/DL — HIGH (ref 70–99)
HCT VFR BLD CALC: 39.7 % — SIGNIFICANT CHANGE UP (ref 34.5–45)
HGB BLD-MCNC: 12.5 G/DL — SIGNIFICANT CHANGE UP (ref 11.5–15.5)
MAGNESIUM SERPL-MCNC: 2 MG/DL — SIGNIFICANT CHANGE UP (ref 1.6–2.6)
MCHC RBC-ENTMCNC: 26.3 PG — LOW (ref 27–34)
MCHC RBC-ENTMCNC: 31.5 G/DL — LOW (ref 32–36)
MCV RBC AUTO: 83.6 FL — SIGNIFICANT CHANGE UP (ref 80–100)
NRBC # BLD: 0 /100 WBCS — SIGNIFICANT CHANGE UP (ref 0–0)
NRBC # FLD: 0 K/UL — SIGNIFICANT CHANGE UP (ref 0–0)
PHOSPHATE SERPL-MCNC: 3.6 MG/DL — SIGNIFICANT CHANGE UP (ref 2.5–4.5)
PLATELET # BLD AUTO: 345 K/UL — SIGNIFICANT CHANGE UP (ref 150–400)
POTASSIUM SERPL-MCNC: 4.6 MMOL/L — SIGNIFICANT CHANGE UP (ref 3.5–5.3)
POTASSIUM SERPL-SCNC: 4.6 MMOL/L — SIGNIFICANT CHANGE UP (ref 3.5–5.3)
PROT SERPL-MCNC: 7.7 G/DL — SIGNIFICANT CHANGE UP (ref 6–8.3)
RBC # BLD: 4.75 M/UL — SIGNIFICANT CHANGE UP (ref 3.8–5.2)
RBC # FLD: 14.8 % — HIGH (ref 10.3–14.5)
SODIUM SERPL-SCNC: 135 MMOL/L — SIGNIFICANT CHANGE UP (ref 135–145)
WBC # BLD: 11.08 K/UL — HIGH (ref 3.8–10.5)
WBC # FLD AUTO: 11.08 K/UL — HIGH (ref 3.8–10.5)

## 2024-12-18 PROCEDURE — 99239 HOSP IP/OBS DSCHRG MGMT >30: CPT | Mod: GC

## 2024-12-18 RX ORDER — ACETAMINOPHEN 500MG 500 MG/1
650 TABLET, COATED ORAL ONCE
Refills: 0 | Status: COMPLETED | OUTPATIENT
Start: 2024-12-18 | End: 2024-12-18

## 2024-12-18 RX ORDER — OXYCODONE HYDROCHLORIDE 30 MG/1
0.5 TABLET ORAL
Qty: 2 | Refills: 0
Start: 2024-12-18 | End: 2024-12-18

## 2024-12-18 RX ORDER — ACETAMINOPHEN 500MG 500 MG/1
2 TABLET, COATED ORAL
Qty: 24 | Refills: 0
Start: 2024-12-18 | End: 2024-12-20

## 2024-12-18 RX ADMIN — METOPROLOL TARTRATE 50 MILLIGRAM(S): 100 TABLET, FILM COATED ORAL at 05:52

## 2024-12-18 RX ADMIN — Medication 81 MILLIGRAM(S): at 12:01

## 2024-12-18 RX ADMIN — Medication 1 APPLICATION(S): at 05:53

## 2024-12-18 RX ADMIN — ACETAMINOPHEN 500MG 650 MILLIGRAM(S): 500 TABLET, COATED ORAL at 12:53

## 2024-12-18 RX ADMIN — PANTOPRAZOLE SODIUM 40 MILLIGRAM(S): 40 TABLET, DELAYED RELEASE ORAL at 07:20

## 2024-12-18 RX ADMIN — Medication 112 MICROGRAM(S): at 05:52

## 2024-12-18 RX ADMIN — POLYETHYLENE GLYCOL 3350 17 GRAM(S): 17 POWDER, FOR SOLUTION ORAL at 12:01

## 2024-12-18 RX ADMIN — ACETAMINOPHEN 500MG 650 MILLIGRAM(S): 500 TABLET, COATED ORAL at 12:01

## 2024-12-18 NOTE — PROGRESS NOTE ADULT - SUBJECTIVE AND OBJECTIVE BOX
DATE OF SERVICE: 12-18-24 @ 07:21    Patient is a 64y old  Female who presents with a chief complaint of Abdominal pain (17 Dec 2024 10:12)      SUBJECTIVE / OVERNIGHT EVENTS:  Overnight,  Pt seen and examined at bedside.    ROS negative except as above.    MEDICATIONS  (STANDING):  aspirin enteric coated 81 milliGRAM(s) Oral daily  dextrose 5%. 1000 milliLiter(s) (100 mL/Hr) IV Continuous <Continuous>  dextrose 5%. 1000 milliLiter(s) (50 mL/Hr) IV Continuous <Continuous>  dextrose 50% Injectable 25 Gram(s) IV Push once  dextrose 50% Injectable 12.5 Gram(s) IV Push once  dextrose 50% Injectable 25 Gram(s) IV Push once  glucagon  Injectable 1 milliGRAM(s) IntraMuscular once  insulin glargine Injectable (LANTUS) 6 Unit(s) SubCutaneous at bedtime  insulin lispro (ADMELOG) corrective regimen sliding scale   SubCutaneous Before meals and at bedtime  levothyroxine 112 MICROGram(s) Oral daily  metoprolol succinate ER 50 milliGRAM(s) Oral daily  mupirocin 2% Ointment 1 Application(s) Both Nostrils two times a day  pantoprazole    Tablet 40 milliGRAM(s) Oral before breakfast  polyethylene glycol 3350 17 Gram(s) Oral daily  rosuvastatin 5 milliGRAM(s) Oral at bedtime  senna 2 Tablet(s) Oral at bedtime  sodium chloride 0.9%. 1000 milliLiter(s) (60 mL/Hr) IV Continuous <Continuous>    MEDICATIONS  (PRN):  dextrose Oral Gel 15 Gram(s) Oral once PRN Blood Glucose LESS THAN 70 milliGRAM(s)/deciliter  metoclopramide Injectable 5 milliGRAM(s) IV Push every 8 hours PRN n/v      Vital Signs Last 24 Hrs  T(C): 36.5 (18 Dec 2024 05:50), Max: 37.2 (17 Dec 2024 14:55)  T(F): 97.7 (18 Dec 2024 05:50), Max: 98.9 (17 Dec 2024 14:55)  HR: 64 (18 Dec 2024 05:50) (57 - 76)  BP: 122/71 (18 Dec 2024 05:50) (112/68 - 141/79)  BP(mean): 86 (17 Dec 2024 23:00) (78 - 93)  RR: 17 (18 Dec 2024 05:50) (13 - 22)  SpO2: 97% (18 Dec 2024 05:50) (93% - 100%)    Parameters below as of 18 Dec 2024 05:50  Patient On (Oxygen Delivery Method): room air      CAPILLARY BLOOD GLUCOSE      POCT Blood Glucose.: 232 mg/dL (17 Dec 2024 23:22)  POCT Blood Glucose.: 206 mg/dL (17 Dec 2024 20:52)  POCT Blood Glucose.: 117 mg/dL (17 Dec 2024 14:52)  POCT Blood Glucose.: 141 mg/dL (17 Dec 2024 12:13)  POCT Blood Glucose.: 204 mg/dL (17 Dec 2024 08:48)  POCT Blood Glucose.: 208 mg/dL (17 Dec 2024 07:40)    I&O's Summary      PHYSICAL EXAM:  GENERAL: NAD, well-developed  HEAD:  Atraumatic, Normocephalic  EYES: EOMI, conjunctiva and sclera clear  NECK: Supple, No JVD  CHEST/LUNG: Clear to auscultation bilaterally; No wheeze  HEART: Regular rate and rhythm; No murmurs, rubs, or gallops  ABDOMEN: Soft, Nontender, Nondistended; Bowel sounds present  EXTREMITIES:  2+ Peripheral Pulses, No clubbing, cyanosis, or edema  NEUROLOGY: AOx3, non-focal  SKIN: No rashes or lesions    LABS:                        11.2   6.89  )-----------( 269      ( 17 Dec 2024 05:10 )             36.5     12-17    142  |  104  |  7   ----------------------------<  189[H]  4.2   |  24  |  0.63    Ca    9.5      17 Dec 2024 05:10  Phos  3.6     12-17  Mg     2.00     12-17    TPro  7.1  /  Alb  3.2[L]  /  TBili  1.3[H]  /  DBili  x   /  AST  67[H]  /  ALT  234[H]  /  AlkPhos  220[H]  12-17    PT/INR - ( 17 Dec 2024 07:14 )   PT: 11.9 sec;   INR: 1.03 ratio         PTT - ( 17 Dec 2024 07:14 )  PTT:34.6 sec        MICRO:      RADIOLOGY & ADDITIONAL TESTS:           DATE OF SERVICE: 12-18-24 @ 07:21    Patient is a 64y old  Female who presents with a chief complaint of Abdominal pain (17 Dec 2024 10:12)      SUBJECTIVE / OVERNIGHT EVENTS:  Overnight, pt went to the OR for her cholecystectomy   Pt seen and examined at bedside. Pt well this morning. Eating breakfast. + flatus. No N/V or Bm yet. Manageable pain.     ROS negative except as above.    MEDICATIONS  (STANDING):  aspirin enteric coated 81 milliGRAM(s) Oral daily  dextrose 5%. 1000 milliLiter(s) (100 mL/Hr) IV Continuous <Continuous>  dextrose 5%. 1000 milliLiter(s) (50 mL/Hr) IV Continuous <Continuous>  dextrose 50% Injectable 25 Gram(s) IV Push once  dextrose 50% Injectable 12.5 Gram(s) IV Push once  dextrose 50% Injectable 25 Gram(s) IV Push once  glucagon  Injectable 1 milliGRAM(s) IntraMuscular once  insulin glargine Injectable (LANTUS) 6 Unit(s) SubCutaneous at bedtime  insulin lispro (ADMELOG) corrective regimen sliding scale   SubCutaneous Before meals and at bedtime  levothyroxine 112 MICROGram(s) Oral daily  metoprolol succinate ER 50 milliGRAM(s) Oral daily  mupirocin 2% Ointment 1 Application(s) Both Nostrils two times a day  pantoprazole    Tablet 40 milliGRAM(s) Oral before breakfast  polyethylene glycol 3350 17 Gram(s) Oral daily  rosuvastatin 5 milliGRAM(s) Oral at bedtime  senna 2 Tablet(s) Oral at bedtime  sodium chloride 0.9%. 1000 milliLiter(s) (60 mL/Hr) IV Continuous <Continuous>    MEDICATIONS  (PRN):  dextrose Oral Gel 15 Gram(s) Oral once PRN Blood Glucose LESS THAN 70 milliGRAM(s)/deciliter  metoclopramide Injectable 5 milliGRAM(s) IV Push every 8 hours PRN n/v      Vital Signs Last 24 Hrs  T(C): 36.5 (18 Dec 2024 05:50), Max: 37.2 (17 Dec 2024 14:55)  T(F): 97.7 (18 Dec 2024 05:50), Max: 98.9 (17 Dec 2024 14:55)  HR: 64 (18 Dec 2024 05:50) (57 - 76)  BP: 122/71 (18 Dec 2024 05:50) (112/68 - 141/79)  BP(mean): 86 (17 Dec 2024 23:00) (78 - 93)  RR: 17 (18 Dec 2024 05:50) (13 - 22)  SpO2: 97% (18 Dec 2024 05:50) (93% - 100%)    Parameters below as of 18 Dec 2024 05:50  Patient On (Oxygen Delivery Method): room air      CAPILLARY BLOOD GLUCOSE      POCT Blood Glucose.: 232 mg/dL (17 Dec 2024 23:22)  POCT Blood Glucose.: 206 mg/dL (17 Dec 2024 20:52)  POCT Blood Glucose.: 117 mg/dL (17 Dec 2024 14:52)  POCT Blood Glucose.: 141 mg/dL (17 Dec 2024 12:13)  POCT Blood Glucose.: 204 mg/dL (17 Dec 2024 08:48)  POCT Blood Glucose.: 208 mg/dL (17 Dec 2024 07:40)    I&O's Summary      PHYSICAL EXAM:  GENERAL: NAD, well-developed  HEAD:  Atraumatic, Normocephalic  EYES: EOMI, conjunctiva and sclera clear  NECK: Supple, No JVD  CHEST/LUNG: Clear to auscultation bilaterally; No wheeze  HEART: Regular rate and rhythm; No murmurs, rubs, or gallops  ABDOMEN: Soft, mild TTP, nondistended. + Bowel sounds. Clean/dry intact laparoscopic incisions with dressing in place   EXTREMITIES:  2+ Peripheral Pulses, No clubbing, cyanosis, or edema  NEUROLOGY: AOx3, non-focal  SKIN: No rashes or lesions    LABS:                        11.2   6.89  )-----------( 269      ( 17 Dec 2024 05:10 )             36.5     12-17    142  |  104  |  7   ----------------------------<  189[H]  4.2   |  24  |  0.63    Ca    9.5      17 Dec 2024 05:10  Phos  3.6     12-17  Mg     2.00     12-17    TPro  7.1  /  Alb  3.2[L]  /  TBili  1.3[H]  /  DBili  x   /  AST  67[H]  /  ALT  234[H]  /  AlkPhos  220[H]  12-17    PT/INR - ( 17 Dec 2024 07:14 )   PT: 11.9 sec;   INR: 1.03 ratio         PTT - ( 17 Dec 2024 07:14 )  PTT:34.6 sec        MICRO:      RADIOLOGY & ADDITIONAL TESTS:

## 2024-12-18 NOTE — PROGRESS NOTE ADULT - PROBLEM/PLAN-3
DISPLAY PLAN FREE TEXT
Universal Safety Interventions

## 2024-12-18 NOTE — PROGRESS NOTE ADULT - ASSESSMENT
65 yo F w/ PMHx of T2DM, HTN, HLD, hypothyroidism admitted w/ acute cholecystitis, dilated CBD and likely passed stone.     Recommendations:   - POD#1 robo cholecystectomy   - Recovering appropriately post-op   - T bili normalized today  - Okay for regular diet  - Okay for discharge home   - Wound care: remove Opsite dressings tomorrow. Can leave incisions open to air or cover for comfort     - Okay to shower. Allow soapy water to run through. Pat dry, do not scrub     - Instructions discussed w/ patient   - Please have patient f/u with Dr. See outpatient in 2 weeks for post-operative follow-up  - Please call back with questions or concerns       B Team Surgery   [id number]

## 2024-12-18 NOTE — PROGRESS NOTE ADULT - NSPROGADDITIONALINFOA_GEN_ALL_CORE
Preliminary note. Recommendations not finalized until attending attestation     Lane Zavaleta MD  PGY 1

## 2024-12-18 NOTE — PROGRESS NOTE ADULT - ATTENDING COMMENTS
Agree with above  # Elevated Liver Enzymes: Suspect secondary to passed stone given rapidly improving bili. EUS negative for choledocholithiasis     Follow up with Sx  Trend liver enzymes
Discussed and agree with above.
Plan for ERCP/EUS today - will follow up findings.  Surgery on board for possible cholecystectomy on this admission.
EUS report reviewed.  Appreciate GI and surgery.  Plan for lap keo this evening.
Pt tolerating po, some pain at port sites.  Passing gas.  Pt with outpt followup with surgery. discharge planning and coordination ~ 45mins.
Pt s/p MRCP - without significant findings.  Pt continues to have nausea and some vomiting.  Continue with supportive care; zofran prn.  d/c planning when able to tolerate po.
Pt's n/v has improved - awaiting MRCP.  Can advance diet as tolerated.  Maalox prn for reflux.  d/c planning.
Patient seen and examined. Case discussed with Dr. Bains.    64 yr old F with a hx of DM2 on insulin, HTN, HLD, hypothyroidism presents with n/v. US with cholelithiasis, no cholecystitis. Dilated CBD on CT. MRCP shows dilation of CBD and equivocal  findings for acute cholecystitis. Overnight patient febrile Tmax: 100.6. AST/ALT:698/697, Alk phos: 232, T. BILI 3.2     #Sepsis   #Acute Cholangitis   Tmax: 100.6  WBC uptrending to 13K   T. Bili 0.6 -->3.2 (Direct 2.9)   , AST//697)  -GI recs appreciated EUS +/- ERCP Monday  -Surgery recs appreciated   -c/w Empiric Zosyn  -blood cultures in lab, f/u   -U/A, chest xray ordered   -trend WBC, monitor fever curve  -page GI if patient deteriorates   -will likely need lap cholecystectomy during this admission
Patient seen and examined. Case discussed with Dr. Zavaleta.    64 yr old F with a hx of DM2 on insulin, HTN, HLD, hypothyroidism presents with n/v. US with cholelithiasis, no cholecystitis. Dilated CBD on CT. MRCP shows dilation of CBD and equivocal  findings for acute cholecystitis. Found to be febrile with elevated T. bili and LFTs c/f cholangitis    #Sepsis   #Acute Cholangitis   Tmax: 100.6  WBC downtrended 13k -->10k  T. Bili 0.6 -->3.2 (Direct 2.9) -->5.0 9 (Direct 4.8)   , AST//697 ---> , AST//446  -GI recs appreciated EUS +/- ERCP Monday  -Surgery recs appreciated   -c/w Empiric Zosyn  -12/14 BCx: NGTD  -trend WBC, monitor fever curve  -page GI if patient deteriorates   -will likely need lap cholecystectomy during this admission     -rest of care as noted above

## 2024-12-18 NOTE — PROGRESS NOTE ADULT - PROVIDER SPECIALTY LIST ADULT
Internal Medicine
Anesthesia
Gastroenterology
Surgery
Gastroenterology
Internal Medicine
Surgery
Gastroenterology
Internal Medicine

## 2024-12-18 NOTE — PROGRESS NOTE ADULT - PROBLEM SELECTOR PLAN 3
Hx of HTN    Plan:  c/w home metoprolol 50 mg QD , ramipril 5 mg QD

## 2024-12-18 NOTE — PROGRESS NOTE ADULT - PROBLEM SELECTOR PLAN 4
Hx of hyperlipidemia    Plan  - obtain lipid panel  -c/w crestor 5 mg QD

## 2024-12-18 NOTE — PROGRESS NOTE ADULT - PROBLEM SELECTOR PLAN 1
RUQ pain associated with N/V. + leukocytosis of 13.49. US in ED revealed cholelithaisis w/o evidence of acute cholecystitis and dilated common bile duct  Troponin: <6  MRCP - unequivocal for acute keo, PE much improved, tolerating diet   EUS w/ evidence of CBD dilation, evidence of choledocholithiasis     Plan  - Per surg, plan for CCY today   - NPO   - Zosyn for now, c/f ascending cholangitis   - Trend liver enzymes RUQ pain associated with N/V. + leukocytosis of 13.49. US in ED revealed cholelithaisis w/o evidence of acute cholecystitis and dilated common bile duct  Troponin: <6  MRCP - unequivocal for acute keo, PE much improved, tolerating diet   EUS w/ evidence of CBD dilation, evidence of choledocholithiasis     Plan  - CCY w/ surg yesterday   - Regular diet  - Per surg, no further interventions ok for dc w/ follow up   - DC zosyn   - O/p follow up PCP

## 2024-12-18 NOTE — PROGRESS NOTE ADULT - PROBLEM SELECTOR PLAN 6
Hx of GERD  - c/w pantoprazole 40 mg

## 2024-12-18 NOTE — CHART NOTE - NSCHARTNOTEFT_GEN_A_CORE
POST-OPERATIVE NOTE    Subjective:  Patient is s/p robotic assisted cholecystectomy. Recovering appropriately. Pain well controlled. Had chicken broth, denies nausea, denies vomiting. Denies CP/SOB. Voiding spontaneously , passing flatus    Vital Signs Last 24 Hrs  T(C): 36.6 (17 Dec 2024 22:30), Max: 37.2 (17 Dec 2024 14:55)  T(F): 97.8 (17 Dec 2024 22:30), Max: 98.9 (17 Dec 2024 14:55)  HR: 62 (17 Dec 2024 23:00) (57 - 76)  BP: 120/72 (17 Dec 2024 23:00) (112/68 - 141/79)  BP(mean): 86 (17 Dec 2024 23:00) (78 - 93)  RR: 18 (17 Dec 2024 23:00) (13 - 22)  SpO2: 96% (17 Dec 2024 23:00) (93% - 100%)    Parameters below as of 17 Dec 2024 23:00  Patient On (Oxygen Delivery Method): room air      I&O's Detail    aspirin enteric coated 81  metoprolol succinate ER 50    PAST MEDICAL & SURGICAL HISTORY:  Diabetes  H/O: HTN (hypertension)  Hypothyroid  HLD (hyperlipidemia)  H/O  section      Physical Exam:  General: NAD, resting comfortably in bed  Pulmonary: Nonlabored breathing, no respiratory distress  Cardiovascular: NSR  Abdominal: soft, mild binta incisional TTP, nondistended, dressings cdi x4  Extremities: WWP      LABS:                        11.2   6.89  )-----------( 269      ( 17 Dec 2024 05:10 )             36.5         142  |  104  |  7   ----------------------------<  189[H]  4.2   |  24  |  0.63    Ca    9.5      17 Dec 2024 05:10  Phos  3.6       Mg     2.00         TPro  7.1  /  Alb  3.2[L]  /  TBili  1.3[H]  /  DBili  x   /  AST  67[H]  /  ALT  234[H]  /  AlkPhos  220[H]      PT/INR - ( 17 Dec 2024 07:14 )   PT: 11.9 sec;   INR: 1.03 ratio         PTT - ( 17 Dec 2024 07:14 )  PTT:34.6 sec  CAPILLARY BLOOD GLUCOSE      POCT Blood Glucose.: 232 mg/dL (17 Dec 2024 23:22)  POCT Blood Glucose.: 206 mg/dL (17 Dec 2024 20:52)  POCT Blood Glucose.: 117 mg/dL (17 Dec 2024 14:52)  POCT Blood Glucose.: 141 mg/dL (17 Dec 2024 12:13)  POCT Blood Glucose.: 204 mg/dL (17 Dec 2024 08:48)  POCT Blood Glucose.: 208 mg/dL (17 Dec 2024 07:40)      Radiology and Additional Studies:    Assessment:  65 yo F w/ PMHx of T2DM, HTN, HLD, hypothyroidism admitted w/ acute cholecystitis, dilated CBD and likely passed stone, s/p EUS () showing no choledocholithiases, now several hours post op from a robotic assisted cholecystectomy (24, Dr. Santos), recovering appropriately.     Recommendations:   - Regular diet   - no abx needed  - multimodal pain control  - can be discharged from surgical POV-     Please instructed patient to follow up with Dr. Santos in 1-2 weeks and include instructions as below:     You may use Tylenol or Motrin for pain control every 6 hours, with oxycodone as needed if pain is not controlled with the Tylenol/Motrin. We suggest staggering the Tylenol/Motrin every 3 hours for maximum pain relief.     You may shower 24 hours after the operation, but do not soak in the tub, pool, or ocean. The steri-strips will fall off on their own, do not pull them off. Please pat steri-strips dry after showering.     You will follow up outpatient with Dr. Santos in 2 weeks to monitor your progress.     You should call the doctor's office if you develop intractable nausea, vomiting, or pain that cannot be controlled with oxycodone. If the doctor's office is not open or you feel this is an emergency, please call 911 or visit the nearest emergency room.    WOUND CARE: Please do not remove your steri-strips. Staples will be removed at follow-up office visit.     BATHING: Please do not submerge wound underwater. You may shower and/or sponge bathe.     ACTIVITY: No heavy lifting of anything more than 10-15 pounds or anything that will cause straining. Otherwise, you may return to your usual level of physical activity. If you are taking narcotic pain medication (such as Percocet), do NOT drive a car, operate machinery, or make important decisions.     DIET: Low-fiber diet     NOTIFY YOUR SURGEON: If you have any bleeding that does not stop, any pus draining from your wound, any fever (over 100.4) or chills, persistent nausea/vomiting with inability to tolerate food or liquids, persistent diarrhea, or if your pain is not controlled on your discharge pain medications.     FOLLOW-UP:   1. Please make a follow-up appointment with Dr. Coreas within 1-2 weeks of discharge.   2. Please follow-up with your primary care physician in 1 week regarding your hospitalization.      B Team Surgery   [id number]      Plan:  - Pain control as needed  - DVT ppx  - OOB and ambulating as tolerated  - F/u AM labs POST-OPERATIVE NOTE    Subjective:  Patient is s/p robotic assisted cholecystectomy. Recovering appropriately. Pain well controlled. Had chicken broth, denies nausea, denies vomiting. Denies CP/SOB. Voiding spontaneously , passing flatus    Vital Signs Last 24 Hrs  T(C): 36.6 (17 Dec 2024 22:30), Max: 37.2 (17 Dec 2024 14:55)  T(F): 97.8 (17 Dec 2024 22:30), Max: 98.9 (17 Dec 2024 14:55)  HR: 62 (17 Dec 2024 23:00) (57 - 76)  BP: 120/72 (17 Dec 2024 23:00) (112/68 - 141/79)  BP(mean): 86 (17 Dec 2024 23:00) (78 - 93)  RR: 18 (17 Dec 2024 23:00) (13 - 22)  SpO2: 96% (17 Dec 2024 23:00) (93% - 100%)    Parameters below as of 17 Dec 2024 23:00  Patient On (Oxygen Delivery Method): room air      I&O's Detail    aspirin enteric coated 81  metoprolol succinate ER 50    PAST MEDICAL & SURGICAL HISTORY:  Diabetes  H/O: HTN (hypertension)  Hypothyroid  HLD (hyperlipidemia)  H/O  section      Physical Exam:  General: NAD, resting comfortably in bed  Pulmonary: Nonlabored breathing, no respiratory distress  Cardiovascular: NSR  Abdominal: soft, mild binta incisional TTP, nondistended, dressings cdi x4  Extremities: WWP      LABS:                        11.2   6.89  )-----------( 269      ( 17 Dec 2024 05:10 )             36.5         142  |  104  |  7   ----------------------------<  189[H]  4.2   |  24  |  0.63    Ca    9.5      17 Dec 2024 05:10  Phos  3.6       Mg     2.00         TPro  7.1  /  Alb  3.2[L]  /  TBili  1.3[H]  /  DBili  x   /  AST  67[H]  /  ALT  234[H]  /  AlkPhos  220[H]      PT/INR - ( 17 Dec 2024 07:14 )   PT: 11.9 sec;   INR: 1.03 ratio         PTT - ( 17 Dec 2024 07:14 )  PTT:34.6 sec  CAPILLARY BLOOD GLUCOSE      POCT Blood Glucose.: 232 mg/dL (17 Dec 2024 23:22)  POCT Blood Glucose.: 206 mg/dL (17 Dec 2024 20:52)  POCT Blood Glucose.: 117 mg/dL (17 Dec 2024 14:52)  POCT Blood Glucose.: 141 mg/dL (17 Dec 2024 12:13)  POCT Blood Glucose.: 204 mg/dL (17 Dec 2024 08:48)  POCT Blood Glucose.: 208 mg/dL (17 Dec 2024 07:40)      Radiology and Additional Studies:    Assessment:  65 yo F w/ PMHx of T2DM, HTN, HLD, hypothyroidism admitted w/ acute cholecystitis, dilated CBD and likely passed stone, s/p EUS () showing no choledocholithiases, now several hours post op from a robotic assisted cholecystectomy (24, Dr. Santos), recovering appropriately.     Recommendations:   - Regular diet   - no abx needed  - multimodal pain control  - can be discharged from surgical POV:     Please instructed patient to follow up with Dr. Santos in 1-2 weeks and include instructions as below:     You may use Tylenol or Motrin for pain control every 6 hours, with oxycodone as needed if pain is not controlled with the Tylenol/Motrin. We suggest staggering the Tylenol/Motrin every 3 hours for maximum pain relief.     You may shower 24 hours after the operation, but do not soak in the tub, pool, or ocean. The steri-strips will fall off on their own, do not pull them off. Please pat steri-strips dry after showering.     You will follow up outpatient with Dr. Santos in 2 weeks to monitor your progress.     You should call the doctor's office if you develop intractable nausea, vomiting, or pain that cannot be controlled with oxycodone. If the doctor's office is not open or you feel this is an emergency, please call 911 or visit the nearest emergency room.    No heavy lifting of anything more than 10-15 pounds or anything that will cause straining. Otherwise, you may return to your usual level of physical activity. If you are taking narcotic pain medication (such as Percocet), do NOT drive a car, operate machinery, or make important decisions.       B Team Surgery   [id number] POST-OPERATIVE NOTE    Subjective:  Patient is s/p robotic assisted cholecystectomy. Recovering appropriately. Pain well controlled. Had chicken broth, denies nausea, denies vomiting. Denies CP/SOB. Voiding spontaneously , passing flatus    Vital Signs Last 24 Hrs  T(C): 36.6 (17 Dec 2024 22:30), Max: 37.2 (17 Dec 2024 14:55)  T(F): 97.8 (17 Dec 2024 22:30), Max: 98.9 (17 Dec 2024 14:55)  HR: 62 (17 Dec 2024 23:00) (57 - 76)  BP: 120/72 (17 Dec 2024 23:00) (112/68 - 141/79)  BP(mean): 86 (17 Dec 2024 23:00) (78 - 93)  RR: 18 (17 Dec 2024 23:00) (13 - 22)  SpO2: 96% (17 Dec 2024 23:00) (93% - 100%)    Parameters below as of 17 Dec 2024 23:00  Patient On (Oxygen Delivery Method): room air      I&O's Detail    aspirin enteric coated 81  metoprolol succinate ER 50    PAST MEDICAL & SURGICAL HISTORY:  Diabetes  H/O: HTN (hypertension)  Hypothyroid  HLD (hyperlipidemia)  H/O  section      Physical Exam:  General: NAD, resting comfortably in bed  Pulmonary: Nonlabored breathing, no respiratory distress  Cardiovascular: NSR  Abdominal: soft, mild binta incisional TTP, nondistended, dressings cdi x4  Extremities: WWP      LABS:                        11.2   6.89  )-----------( 269      ( 17 Dec 2024 05:10 )             36.5         142  |  104  |  7   ----------------------------<  189[H]  4.2   |  24  |  0.63    Ca    9.5      17 Dec 2024 05:10  Phos  3.6       Mg     2.00         TPro  7.1  /  Alb  3.2[L]  /  TBili  1.3[H]  /  DBili  x   /  AST  67[H]  /  ALT  234[H]  /  AlkPhos  220[H]      PT/INR - ( 17 Dec 2024 07:14 )   PT: 11.9 sec;   INR: 1.03 ratio         PTT - ( 17 Dec 2024 07:14 )  PTT:34.6 sec  CAPILLARY BLOOD GLUCOSE      POCT Blood Glucose.: 232 mg/dL (17 Dec 2024 23:22)  POCT Blood Glucose.: 206 mg/dL (17 Dec 2024 20:52)  POCT Blood Glucose.: 117 mg/dL (17 Dec 2024 14:52)  POCT Blood Glucose.: 141 mg/dL (17 Dec 2024 12:13)  POCT Blood Glucose.: 204 mg/dL (17 Dec 2024 08:48)  POCT Blood Glucose.: 208 mg/dL (17 Dec 2024 07:40)      Radiology and Additional Studies:    Assessment:  65 yo F w/ PMHx of T2DM, HTN, HLD, hypothyroidism admitted w/ acute cholecystitis, dilated CBD and likely passed stone, s/p EUS () showing no choledocholithiases, now several hours post op from a robotic assisted cholecystectomy (24, Dr. Santos), recovering appropriately.     Recommendations:   - Regular diet   - no abx needed  - multimodal pain control- Tylenol 975 q6h, oxycodone 2.5 mg q4h PRN for moderate pain, oxycodone 5 mg q4h PRN for severe pain  - can be discharged from surgical POV:     Please instructed patient to follow up with Dr. Santos in 1-2 weeks and include instructions as below:     You may use Tylenol or Motrin for pain control every 6 hours, with oxycodone as needed if pain is not controlled with the Tylenol/Motrin. We suggest staggering the Tylenol/Motrin every 3 hours for maximum pain relief.     You may shower 24 hours after the operation, but do not soak in the tub, pool, or ocean. The steri-strips will fall off on their own, do not pull them off. Please pat steri-strips dry after showering.     You will follow up outpatient with Dr. Santos in 2 weeks to monitor your progress.     You should call the doctor's office if you develop intractable nausea, vomiting, or pain that cannot be controlled with oxycodone. If the doctor's office is not open or you feel this is an emergency, please call 911 or visit the nearest emergency room.    No heavy lifting of anything more than 10-15 pounds or anything that will cause straining. Otherwise, you may return to your usual level of physical activity. If you are taking narcotic pain medication (such as Percocet), do NOT drive a car, operate machinery, or make important decisions.       B Team Surgery   [id number]

## 2024-12-18 NOTE — PROGRESS NOTE ADULT - PROBLEM/PLAN-6
DISPLAY PLAN FREE TEXT
Ivorian

## 2024-12-18 NOTE — PROGRESS NOTE ADULT - PROBLEM SELECTOR PROBLEM 3
H/O: HTN (hypertension)

## 2024-12-18 NOTE — PROGRESS NOTE ADULT - SUBJECTIVE AND OBJECTIVE BOX
Surgery     Patient seen and examined. Endorsing some abdominal soreness post-op. Had some liquids, tolerating, no N/V        Physical exam:   General: NAD  Cardiovascular: appears well perfused   Respiratory: respirations non labored  Gastrointestinal: soft, nondistended, mild binta-incisional tenderness to palpation. Dressing c/d/i   Neurological: A+Ox3

## 2024-12-18 NOTE — PROGRESS NOTE ADULT - PROBLEM SELECTOR PLAN 7
DVT prophylaxis: SC Heparin  Code Status: Full Code  Diet: NPO for now   Dispo: Pending clinical improvement

## 2024-12-19 LAB
CULTURE RESULTS: SIGNIFICANT CHANGE UP
CULTURE RESULTS: SIGNIFICANT CHANGE UP
SPECIMEN SOURCE: SIGNIFICANT CHANGE UP
SPECIMEN SOURCE: SIGNIFICANT CHANGE UP

## 2024-12-23 LAB — SURGICAL PATHOLOGY STUDY: SIGNIFICANT CHANGE UP

## 2024-12-24 PROBLEM — E78.5 HYPERLIPIDEMIA, UNSPECIFIED: Chronic | Status: ACTIVE | Noted: 2024-12-11

## 2024-12-24 PROBLEM — E03.9 HYPOTHYROIDISM, UNSPECIFIED: Chronic | Status: ACTIVE | Noted: 2024-12-11

## 2024-12-24 PROBLEM — Z86.79 PERSONAL HISTORY OF OTHER DISEASES OF THE CIRCULATORY SYSTEM: Chronic | Status: ACTIVE | Noted: 2024-12-11

## 2025-01-07 ENCOUNTER — APPOINTMENT (OUTPATIENT)
Dept: TRAUMA SURGERY | Facility: HOSPITAL | Age: 65
End: 2025-01-07

## 2025-01-07 VITALS
HEIGHT: 65 IN | BODY MASS INDEX: 32.49 KG/M2 | DIASTOLIC BLOOD PRESSURE: 65 MMHG | HEART RATE: 74 BPM | WEIGHT: 195 LBS | SYSTOLIC BLOOD PRESSURE: 131 MMHG | TEMPERATURE: 97 F

## (undated) DEVICE — DRSG 2X2

## (undated) DEVICE — XI DRAPE COLUMN

## (undated) DEVICE — UNDERPAD LINEN SAVER 17 X 24"

## (undated) DEVICE — TUBING OLYMPUS INSUFFLATION

## (undated) DEVICE — PACK ROBOTIC LIJ

## (undated) DEVICE — GOWN LG

## (undated) DEVICE — POSITIONER PURPLE ARM ONE STEP (LARGE)

## (undated) DEVICE — SYR LUER LOK 20CC

## (undated) DEVICE — XI SEAL UNIVERSIAL 5-12MM

## (undated) DEVICE — BITE BLOCK ADULT 20 X 27MM (GREEN)

## (undated) DEVICE — TUBING STRYKEFLOW II SUCTION / IRRIGATOR

## (undated) DEVICE — GLV 7.5 PROTEXIS (WHITE)

## (undated) DEVICE — APPLICATOR VISTASEAL LAP DUAL 35CM RIGID

## (undated) DEVICE — XI ARM PERMANENT CAUTERY HOOK

## (undated) DEVICE — FOLEY TRAY 16FR 5CC LF UMETER CLOSED

## (undated) DEVICE — XI DRAPE ARM

## (undated) DEVICE — XI ARM FORCEP PROGRASP 8MM

## (undated) DEVICE — TUBING IV SET GRAVITY 3Y 100" MACRO

## (undated) DEVICE — PROTECTOR HEEL / ELBOW FLUFFY

## (undated) DEVICE — CATH IV SAFE BC 22G X 1" (BLUE)

## (undated) DEVICE — ENDOCATCH 10MM

## (undated) DEVICE — SUT MONOCRYL 4-0 27" PS-2 UNDYED

## (undated) DEVICE — BIOPSY FORCEP COLD DISP

## (undated) DEVICE — SALIVA EJECTOR (BLUE)

## (undated) DEVICE — DRSG CURITY GAUZE SPONGE 4 X 4" 12-PLY NON-STERILE

## (undated) DEVICE — SOL IRR POUR H2O 500ML

## (undated) DEVICE — TUBING MEDI-VAC W MAXIGRIP CONNECTORS 1/4"X6'

## (undated) DEVICE — XI ENDOWRIST SUCTION IRRIGATOR 8MM

## (undated) DEVICE — LUBRICATING JELLY HR ONE SHOT 3G

## (undated) DEVICE — WARMING BLANKET UPPER ADULT

## (undated) DEVICE — ELCTR ECG CONDUCTIVE ADHESIVE

## (undated) DEVICE — XI ARM CLIP APPLIER LARGE

## (undated) DEVICE — DENTURE CUP PINK

## (undated) DEVICE — BIOPSY FORCEP RADIAL JAW 4 STANDARD WITH NEEDLE

## (undated) DEVICE — DRSG STERISTRIPS 0.5 X 4"

## (undated) DEVICE — DRAPE SOL WARMING 44X44IN

## (undated) DEVICE — XI ARM FORCEP FENESTRATED BIPOLAR 8MM

## (undated) DEVICE — DRSG BANDAID 0.75X3"

## (undated) DEVICE — POSITIONER STRAP ARMBOARD VELCRO TS-30

## (undated) DEVICE — VENODYNE/SCD SLEEVE CALF MEDIUM

## (undated) DEVICE — CLAMP BX HOT RAD JAW 3

## (undated) DEVICE — D HELP - CLEARVIEW CLEARIFY SYSTEM

## (undated) DEVICE — ELCTR BOVIE PENCIL SMOKE EVACUATION

## (undated) DEVICE — SUT VICRYL 0 27" UR-6

## (undated) DEVICE — SOL IRR POUR NS 0.9% 1000ML

## (undated) DEVICE — SUT POLYSORB 0 60" TIES UNDYED

## (undated) DEVICE — BASIN EMESIS 10IN GRADUATED MAUVE

## (undated) DEVICE — CONTAINER FORMALIN 80ML YELLOW

## (undated) DEVICE — ELCTR GROUNDING PAD ADULT COVIDIEN

## (undated) DEVICE — XI OBTURATOR OPTICAL BLADELESS 8MM

## (undated) DEVICE — POSITIONER PINK PAD PIGAZZI SYSTEM

## (undated) DEVICE — XI ARM SCISSOR MONO CURVED

## (undated) DEVICE — PACK IV START WITH CHG